# Patient Record
Sex: FEMALE | Race: WHITE | HISPANIC OR LATINO | Employment: FULL TIME | URBAN - METROPOLITAN AREA
[De-identification: names, ages, dates, MRNs, and addresses within clinical notes are randomized per-mention and may not be internally consistent; named-entity substitution may affect disease eponyms.]

---

## 2017-05-08 ENCOUNTER — ALLSCRIPTS OFFICE VISIT (OUTPATIENT)
Dept: OTHER | Facility: OTHER | Age: 22
End: 2017-05-08

## 2017-05-08 DIAGNOSIS — M54.16 RADICULOPATHY OF LUMBAR REGION: ICD-10-CM

## 2017-06-30 ENCOUNTER — ALLSCRIPTS OFFICE VISIT (OUTPATIENT)
Dept: OTHER | Facility: OTHER | Age: 22
End: 2017-06-30

## 2017-07-10 DIAGNOSIS — M54.16 RADICULOPATHY OF LUMBAR REGION: ICD-10-CM

## 2017-07-11 ENCOUNTER — GENERIC CONVERSION - ENCOUNTER (OUTPATIENT)
Dept: OTHER | Facility: OTHER | Age: 22
End: 2017-07-11

## 2017-07-24 ENCOUNTER — ALLSCRIPTS OFFICE VISIT (OUTPATIENT)
Dept: OTHER | Facility: OTHER | Age: 22
End: 2017-07-24

## 2017-08-28 ENCOUNTER — ALLSCRIPTS OFFICE VISIT (OUTPATIENT)
Dept: OTHER | Facility: OTHER | Age: 22
End: 2017-08-28

## 2017-09-27 ENCOUNTER — ALLSCRIPTS OFFICE VISIT (OUTPATIENT)
Dept: OTHER | Facility: OTHER | Age: 22
End: 2017-09-27

## 2017-10-05 ENCOUNTER — ALLSCRIPTS OFFICE VISIT (OUTPATIENT)
Dept: OTHER | Facility: OTHER | Age: 22
End: 2017-10-05
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
## 2017-10-09 ENCOUNTER — GENERIC CONVERSION - ENCOUNTER (OUTPATIENT)
Dept: OTHER | Facility: OTHER | Age: 22
End: 2017-10-09

## 2017-10-11 ENCOUNTER — HOSPITAL ENCOUNTER (EMERGENCY)
Facility: HOSPITAL | Age: 22
Discharge: HOME | End: 2017-10-11
Attending: EMERGENCY MEDICINE | Admitting: EMERGENCY MEDICINE
Payer: COMMERCIAL

## 2017-10-11 ENCOUNTER — GENERIC CONVERSION - ENCOUNTER (OUTPATIENT)
Dept: OTHER | Facility: OTHER | Age: 22
End: 2017-10-11

## 2017-10-11 VITALS
BODY MASS INDEX: 19.94 KG/M2 | OXYGEN SATURATION: 100 % | HEART RATE: 83 BPM | SYSTOLIC BLOOD PRESSURE: 119 MMHG | RESPIRATION RATE: 20 BRPM | TEMPERATURE: 98 F | DIASTOLIC BLOOD PRESSURE: 57 MMHG | HEIGHT: 61 IN | WEIGHT: 105.6 LBS

## 2017-10-11 DIAGNOSIS — O21.9 NAUSEA AND VOMITING IN PREGNANCY PRIOR TO 22 WEEKS GESTATION: Primary | ICD-10-CM

## 2017-10-11 LAB
ANION GAP SERPL CALCULATED.3IONS-SCNC: 9 MMOL/L (ref 4–13)
BACTERIA UR QL AUTO: ABNORMAL /HPF
BASOPHILS # BLD AUTO: 0.04 THOUSANDS/ΜL (ref 0–0.1)
BASOPHILS NFR BLD AUTO: 1 % (ref 0–1)
BILIRUB UR QL STRIP: NEGATIVE
BUN SERPL-MCNC: 7 MG/DL (ref 5–25)
CALCIUM SERPL-MCNC: 9.4 MG/DL (ref 8.3–10.1)
CHLORIDE SERPL-SCNC: 100 MMOL/L (ref 100–108)
CLARITY UR: CLEAR
CO2 SERPL-SCNC: 27 MMOL/L (ref 21–32)
COLOR UR: YELLOW
CREAT SERPL-MCNC: 0.5 MG/DL (ref 0.6–1.3)
EOSINOPHIL # BLD AUTO: 0.09 THOUSAND/ΜL (ref 0–0.61)
EOSINOPHIL NFR BLD AUTO: 1 % (ref 0–6)
ERYTHROCYTE [DISTWIDTH] IN BLOOD BY AUTOMATED COUNT: 12.5 % (ref 11.6–15.1)
GFR SERPL CREATININE-BSD FRML MDRD: 137 ML/MIN/1.73SQ M
GLUCOSE SERPL-MCNC: 81 MG/DL (ref 65–140)
GLUCOSE UR STRIP-MCNC: NEGATIVE MG/DL
HCT VFR BLD AUTO: 40.1 % (ref 34.8–46.1)
HGB BLD-MCNC: 13.5 G/DL (ref 11.5–15.4)
HGB UR QL STRIP.AUTO: NEGATIVE
KETONES UR STRIP-MCNC: ABNORMAL MG/DL
LEUKOCYTE ESTERASE UR QL STRIP: ABNORMAL
LYMPHOCYTES # BLD AUTO: 1.79 THOUSANDS/ΜL (ref 0.6–4.47)
LYMPHOCYTES NFR BLD AUTO: 22 % (ref 14–44)
MCH RBC QN AUTO: 27.3 PG (ref 26.8–34.3)
MCHC RBC AUTO-ENTMCNC: 33.7 G/DL (ref 31.4–37.4)
MCV RBC AUTO: 81 FL (ref 82–98)
MONOCYTES # BLD AUTO: 0.65 THOUSAND/ΜL (ref 0.17–1.22)
MONOCYTES NFR BLD AUTO: 8 % (ref 4–12)
NEUTROPHILS # BLD AUTO: 5.64 THOUSANDS/ΜL (ref 1.85–7.62)
NEUTS SEG NFR BLD AUTO: 69 % (ref 43–75)
NITRITE UR QL STRIP: NEGATIVE
NON-SQ EPI CELLS URNS QL MICRO: ABNORMAL /HPF
NRBC BLD AUTO-RTO: 0 /100 WBCS
PH UR STRIP.AUTO: 6 [PH] (ref 4.5–8)
PLATELET # BLD AUTO: 264 THOUSANDS/UL (ref 149–390)
PMV BLD AUTO: 9.5 FL (ref 8.9–12.7)
POTASSIUM SERPL-SCNC: 3.8 MMOL/L (ref 3.5–5.3)
PROT UR STRIP-MCNC: NEGATIVE MG/DL
RBC # BLD AUTO: 4.94 MILLION/UL (ref 3.81–5.12)
RBC #/AREA URNS AUTO: ABNORMAL /HPF
SODIUM SERPL-SCNC: 136 MMOL/L (ref 136–145)
SP GR UR STRIP.AUTO: <=1.005 (ref 1–1.03)
UROBILINOGEN UR QL STRIP.AUTO: 0.2 E.U./DL
WBC # BLD AUTO: 8.23 THOUSAND/UL (ref 4.31–10.16)
WBC #/AREA URNS AUTO: ABNORMAL /HPF

## 2017-10-11 PROCEDURE — 96374 THER/PROPH/DIAG INJ IV PUSH: CPT

## 2017-10-11 PROCEDURE — 36415 COLL VENOUS BLD VENIPUNCTURE: CPT | Performed by: EMERGENCY MEDICINE

## 2017-10-11 PROCEDURE — 96361 HYDRATE IV INFUSION ADD-ON: CPT

## 2017-10-11 PROCEDURE — 80048 BASIC METABOLIC PNL TOTAL CA: CPT | Performed by: EMERGENCY MEDICINE

## 2017-10-11 PROCEDURE — 99284 EMERGENCY DEPT VISIT MOD MDM: CPT

## 2017-10-11 PROCEDURE — 80081 OBSTETRIC PANEL INC HIV TSTG: CPT | Performed by: EMERGENCY MEDICINE

## 2017-10-11 PROCEDURE — 96375 TX/PRO/DX INJ NEW DRUG ADDON: CPT

## 2017-10-11 PROCEDURE — 81001 URINALYSIS AUTO W/SCOPE: CPT | Performed by: EMERGENCY MEDICINE

## 2017-10-11 RX ORDER — CALCIUM CARBONATE 300MG(750)
TABLET,CHEWABLE ORAL
COMMUNITY
End: 2018-01-29 | Stop reason: ALTCHOICE

## 2017-10-11 RX ORDER — METOCLOPRAMIDE HYDROCHLORIDE 5 MG/ML
5 INJECTION INTRAMUSCULAR; INTRAVENOUS ONCE
Status: COMPLETED | OUTPATIENT
Start: 2017-10-11 | End: 2017-10-11

## 2017-10-11 RX ORDER — DIPHENHYDRAMINE HYDROCHLORIDE 50 MG/ML
25 INJECTION INTRAMUSCULAR; INTRAVENOUS ONCE
Status: COMPLETED | OUTPATIENT
Start: 2017-10-11 | End: 2017-10-11

## 2017-10-11 RX ORDER — METOCLOPRAMIDE 10 MG/1
5 TABLET ORAL 3 TIMES DAILY PRN
Qty: 15 TABLET | Refills: 0 | Status: SHIPPED | OUTPATIENT
Start: 2017-10-11 | End: 2018-01-29 | Stop reason: ALTCHOICE

## 2017-10-11 RX ADMIN — DIPHENHYDRAMINE HYDROCHLORIDE 25 MG: 50 INJECTION, SOLUTION INTRAMUSCULAR; INTRAVENOUS at 12:46

## 2017-10-11 RX ADMIN — SODIUM CHLORIDE 1000 ML: 0.9 INJECTION, SOLUTION INTRAVENOUS at 12:11

## 2017-10-11 RX ADMIN — METOCLOPRAMIDE 5 MG: 5 INJECTION, SOLUTION INTRAMUSCULAR; INTRAVENOUS at 12:45

## 2017-10-11 NOTE — ED NOTES
Patient is resting comfortably  States feeling "better" and "feeling hungry" at this time       Sajan Will RN  10/11/17 2582

## 2017-10-11 NOTE — ED NOTES
Pt aware that a urine sample is needed, unable to void at this time       López Sommer RN  10/11/17 3468

## 2017-10-11 NOTE — DISCHARGE INSTRUCTIONS
Hyperemesis Gravidarum   WHAT YOU NEED TO KNOW:   Hyperemesis gravidarum is a severe form of nausea and vomiting that happens during pregnancy  Hyperemesis is more severe than morning sickness  It may cause you to have nausea or vomiting all day for many days  It may also keep you from getting enough food and liquid  DISCHARGE INSTRUCTIONS:   Return to the emergency department if:   · You have signs of severe dehydration including little to no urine and dry mouth or lips  · You have severe stomach pain  · You feel too weak or dizzy to stand up  · You see blood in your vomit or bowel movements  Contact your healthcare provider if:   · You cannot keep any food or liquid down  · You are losing weight  · You have a fever  · You have questions or concerns about your condition or care  Medicines:   · Medicines, vitamins, or supplements  may be given to help decrease nausea and vomiting  · Take your medicine as directed  Contact your healthcare provider if you think your medicine is not helping or if you have side effects  Tell him of her if you are allergic to any medicine  Keep a list of the medicines, vitamins, and herbs you take  Include the amounts, and when and why you take them  Bring the list or the pill bottles to follow-up visits  Carry your medicine list with you in case of an emergency  Manage your symptoms:   · Eat small amounts of food every 1 to 2 hours  Some examples of good foods to eat include broth, toast, crackers, fruit, eggs, gelatin, or cottage cheese  Do not eat spicy or high-fat foods  Foods and drinks with ginger, such as ginger ale, may help to decrease nausea and vomiting  · Drink liquids as directed  You may need to drink small amounts of liquid often to prevent dehydration  Ask how much liquid to drink each day and which liquids are best for you  · Rest when you need to    Start activity slowly and work up to your usual routine as you start to feel better  · Medicines, vitamins, or supplements  may be given to help decrease nausea and vomiting  · Weigh yourself daily if directed by your healthcare provider  You may need to keep a record of your daily weights for your healthcare provider  He may want to make sure you are not losing too much weight  Follow up with your healthcare provider as directed:  Write down your questions so you remember to ask them during your visits  © 2017 [employer] Information is for End User's use only and may not be sold, redistributed or otherwise used for commercial purposes  All illustrations and images included in CareNotes® are the copyrighted property of A D [employer] M , Inc  or Jose Luis Ledezma  The above information is an  only  It is not intended as medical advice for individual conditions or treatments  Talk to your doctor, nurse or pharmacist before following any medical regimen to see if it is safe and effective for you

## 2017-10-11 NOTE — ED PROVIDER NOTES
History  Chief Complaint   Patient presents with    Vomiting During Pregnancy     Pt c/o nausea and vomiting since beginning of pregnancy (10 weeks); vomited x 1 today following eating breakfast; pt spoke with OB-GYN Dr Pastora Wolff who told her to come to ED for dehydration       History provided by:  Patient  Vomiting   Severity:  Moderate  Duration:  2 weeks  Timing:  Constant  Number of daily episodes:  3-4  Quality:  Stomach contents  Able to tolerate:  Liquids  How soon after eating does vomiting occur:  5 minutes  Progression:  Worsening  Chronicity:  New  Recent urination:  Decreased  Context: not post-tussive and not self-induced    Relieved by:  None tried  Worsened by:  Nothing  Ineffective treatments:  None tried  Associated symptoms: no cough, no diarrhea, no fever, no headaches, no myalgias, no sore throat and no URI    Risk factors: pregnant (pt is , is about 10 weeks, had confirmatory IUP on u/s withoutpt OB eval)    Risk factors: no prior abdominal surgery        Prior to Admission Medications   Prescriptions Last Dose Informant Patient Reported? Taking? IRON PO   Yes No   Sig: Take 325 mg by mouth   Prenatal MV-Min-FA-Omega-3 (PRENATAL GUMMIES/DHA & FA) 0 4-32 5 MG CHEW   Yes No   Sig: Chew      Facility-Administered Medications: None       Past Medical History:   Diagnosis Date    Back injury     Pregnant        History reviewed  No pertinent surgical history  History reviewed  No pertinent family history  I have reviewed and agree with the history as documented  Social History   Substance Use Topics    Smoking status: Never Smoker    Smokeless tobacco: Never Used    Alcohol use No        Review of Systems   Constitutional: Negative for fever  HENT: Negative for sore throat  Respiratory: Negative for cough  Gastrointestinal: Positive for vomiting  Negative for diarrhea  Musculoskeletal: Negative for myalgias  Neurological: Negative for headaches     All other systems reviewed and are negative  Physical Exam  ED Triage Vitals [10/11/17 1118]   Temperature Pulse Respirations Blood Pressure SpO2   98 °F (36 7 °C) 77 20 125/76 100 %      Temp Source Heart Rate Source Patient Position - Orthostatic VS BP Location FiO2 (%)   Oral Monitor Sitting Right arm --      Pain Score       --           Physical Exam   Constitutional: She is oriented to person, place, and time  She appears well-developed and well-nourished  No distress  HENT:   Head: Normocephalic and atraumatic  Nose: Nose normal    Mouth/Throat: Oropharynx is clear and moist  Mucous membranes are dry  Eyes: Conjunctivae and EOM are normal  Pupils are equal, round, and reactive to light  Neck: Normal range of motion  Neck supple  Cardiovascular: Normal rate, regular rhythm, normal heart sounds and intact distal pulses  Pulmonary/Chest: Effort normal and breath sounds normal  No respiratory distress  Abdominal: Soft  Bowel sounds are normal  She exhibits no distension  There is no tenderness  Musculoskeletal: Normal range of motion  Neurological: She is alert and oriented to person, place, and time  Skin: Skin is warm and dry  She is not diaphoretic  Psychiatric: She has a normal mood and affect  Nursing note and vitals reviewed        ED Medications  Medications   sodium chloride 0 9 % bolus 1,000 mL (0 mL Intravenous Stopped 10/11/17 1405)   metoclopramide (REGLAN) injection 5 mg (5 mg Intravenous Given 10/11/17 1245)   diphenhydrAMINE (BENADRYL) injection 25 mg (25 mg Intravenous Given 10/11/17 1246)       Diagnostic Studies  Labs Reviewed   CBC AND DIFFERENTIAL - Abnormal        Result Value Ref Range Status    MCV 81 (*) 82 - 98 fL Final    WBC 8 23  4 31 - 10 16 Thousand/uL Final    RBC 4 94  3 81 - 5 12 Million/uL Final    Hemoglobin 13 5  11 5 - 15 4 g/dL Final    Hematocrit 40 1  34 8 - 46 1 % Final    MCH 27 3  26 8 - 34 3 pg Final    MCHC 33 7  31 4 - 37 4 g/dL Final    RDW 12 5 11 6 - 15 1 % Final    MPV 9 5  8 9 - 12 7 fL Final    Platelets 498  919 - 390 Thousands/uL Final    nRBC 0  /100 WBCs Final    Neutrophils Relative 69  43 - 75 % Final    Lymphocytes Relative 22  14 - 44 % Final    Monocytes Relative 8  4 - 12 % Final    Eosinophils Relative 1  0 - 6 % Final    Basophils Relative 1  0 - 1 % Final    Neutrophils Absolute 5 64  1 85 - 7 62 Thousands/µL Final    Lymphocytes Absolute 1 79  0 60 - 4 47 Thousands/µL Final    Monocytes Absolute 0 65  0 17 - 1 22 Thousand/µL Final    Eosinophils Absolute 0 09  0 00 - 0 61 Thousand/µL Final    Basophils Absolute 0 04  0 00 - 0 10 Thousands/µL Final   BASIC METABOLIC PANEL - Abnormal     Creatinine 0 50 (*) 0 60 - 1 30 mg/dL Final    Comment: Standardized to IDMS reference method    Sodium 136  136 - 145 mmol/L Final    Potassium 3 8  3 5 - 5 3 mmol/L Final    Chloride 100  100 - 108 mmol/L Final    CO2 27  21 - 32 mmol/L Final    Anion Gap 9  4 - 13 mmol/L Final    BUN 7  5 - 25 mg/dL Final    Glucose 81  65 - 140 mg/dL Final    Comment:   If the patient is fasting, the ADA then defines impaired fasting glucose as > 100 mg/dL and diabetes as > or equal to 123 mg/dL  Specimen collection should occur prior to Sulfasalazine administration due to the potential for falsely depressed results  Specimen collection should occur prior to Sulfapyridine administration due to the potential for falsely elevated results  Calcium 9 4  8 3 - 10 1 mg/dL Final    eGFR 137  ml/min/1 73sq m Final    Narrative:     National Kidney Disease Education Program recommendations are as follows:  GFR calculation is accurate only with a steady state creatinine  Chronic Kidney disease less than 60 ml/min/1 73 sq  meters  Kidney failure less than 15 ml/min/1 73 sq  meters     UA W REFLEX TO MICROSCOPIC WITH REFLEX TO CULTURE - Abnormal     Leukocytes, UA Small (*) Negative Final    Ketones, UA 15 (1+) (*) Negative mg/dl Final    Color, UA Yellow   Final    Clarity, UA Clear   Final    Specific Gravity, UA <=1 005  1 003 - 1 030 Final    pH, UA 6 0  4 5 - 8 0 Final    Nitrite, UA Negative  Negative Final    Protein, UA Negative  Negative mg/dl Final    Glucose, UA Negative  Negative mg/dl Final    Urobilinogen, UA 0 2  0 2, 1 0 E U /dl E U /dl Final    Bilirubin, UA Negative  Negative Final    Blood, UA Negative  Negative Final   URINE MICROSCOPIC       No orders to display       Procedures  Procedures      Phone Contacts  ED Phone Contact    ED Course  ED Course as of Oct 11 1459   Wed Oct 11, 2017   1458 Patient has gotten IV fluids, was able to get up and urinate, is feeling improved  Would like to try some crackers and to eat something  Will discharge her with Reglan p r n  and follow up with her OBGYN                                 MDM  Number of Diagnoses or Management Options  Nausea and vomiting in pregnancy prior to 22 weeks gestation: new and requires workup     Amount and/or Complexity of Data Reviewed  Clinical lab tests: ordered and reviewed  Review and summarize past medical records: yes (U/s with confirmed IUP with [facility])      CritCare Time    Disposition  Final diagnoses:   Nausea and vomiting in pregnancy prior to 22 weeks gestation     ED Disposition     ED Disposition Condition Comment    Discharge  Isai Mediate discharge to home/self care      Condition at discharge: Good        Follow-up Information     Follow up With Specialties Details Why Contact Info    [facility] Physician Assistant   [address] [place] [address] [facility]      Kelin Tomlin MD Obstetrics and Gynecology Call in 1 day  [address]  [phone]          Patient's Medications   Discharge Prescriptions    METOCLOPRAMIDE (REGLAN) 10 MG TABLET    Take 0 5 tablets by mouth 3 (three) times a day as needed (vomiting)       Start Date: 10/11/2017End Date: --       Order Dose: 5 mg       Quantity: 15 tablet    Refills: 0     No discharge procedures on file      ED Provider  Electronically Signed by       Christopher Zarco MD  10/11/17 6066

## 2017-10-12 ENCOUNTER — GENERIC CONVERSION - ENCOUNTER (OUTPATIENT)
Dept: OTHER | Facility: OTHER | Age: 22
End: 2017-10-12

## 2017-10-13 ENCOUNTER — APPOINTMENT (OUTPATIENT)
Dept: LAB | Facility: CLINIC | Age: 22
End: 2017-10-13
Payer: COMMERCIAL

## 2017-10-13 ENCOUNTER — LAB REQUISITION (OUTPATIENT)
Dept: LAB | Facility: HOSPITAL | Age: 22
End: 2017-10-13
Payer: COMMERCIAL

## 2017-10-13 ENCOUNTER — ALLSCRIPTS OFFICE VISIT (OUTPATIENT)
Dept: OTHER | Facility: OTHER | Age: 22
End: 2017-10-13

## 2017-10-13 ENCOUNTER — GENERIC CONVERSION - ENCOUNTER (OUTPATIENT)
Dept: OTHER | Facility: OTHER | Age: 22
End: 2017-10-13

## 2017-10-13 DIAGNOSIS — Z34.90 ENCOUNTER FOR SUPERVISION OF NORMAL PREGNANCY: ICD-10-CM

## 2017-10-13 LAB
ABO GROUP BLD: NORMAL
BACTERIA UR QL AUTO: ABNORMAL /HPF
BASOPHILS # BLD AUTO: 0.02 THOUSANDS/ΜL (ref 0–0.1)
BASOPHILS NFR BLD AUTO: 0 % (ref 0–1)
BILIRUB UR QL STRIP: NEGATIVE
BLD GP AB SCN SERPL QL: NEGATIVE
CLARITY UR: ABNORMAL
COLOR UR: YELLOW
EOSINOPHIL # BLD AUTO: 0.05 THOUSAND/ΜL (ref 0–0.61)
EOSINOPHIL NFR BLD AUTO: 1 % (ref 0–6)
ERYTHROCYTE [DISTWIDTH] IN BLOOD BY AUTOMATED COUNT: 13.2 % (ref 11.6–15.1)
GLUCOSE UR STRIP-MCNC: NEGATIVE MG/DL
HBV SURFACE AG SER QL: NORMAL
HCT VFR BLD AUTO: 39.8 % (ref 34.8–46.1)
HGB BLD-MCNC: 13.2 G/DL (ref 11.5–15.4)
HGB UR QL STRIP.AUTO: NEGATIVE
KETONES UR STRIP-MCNC: NEGATIVE MG/DL
LEUKOCYTE ESTERASE UR QL STRIP: ABNORMAL
LYMPHOCYTES # BLD AUTO: 1.53 THOUSANDS/ΜL (ref 0.6–4.47)
LYMPHOCYTES NFR BLD AUTO: 21 % (ref 14–44)
MCH RBC QN AUTO: 27.4 PG (ref 26.8–34.3)
MCHC RBC AUTO-ENTMCNC: 33.2 G/DL (ref 31.4–37.4)
MCV RBC AUTO: 83 FL (ref 82–98)
MONOCYTES # BLD AUTO: 0.54 THOUSAND/ΜL (ref 0.17–1.22)
MONOCYTES NFR BLD AUTO: 7 % (ref 4–12)
NEUTROPHILS # BLD AUTO: 5.19 THOUSANDS/ΜL (ref 1.85–7.62)
NEUTS SEG NFR BLD AUTO: 71 % (ref 43–75)
NITRITE UR QL STRIP: NEGATIVE
NON-SQ EPI CELLS URNS QL MICRO: ABNORMAL /HPF
NRBC BLD AUTO-RTO: 0 /100 WBCS
PH UR STRIP.AUTO: 6 [PH] (ref 4.5–8)
PLATELET # BLD AUTO: 271 THOUSANDS/UL (ref 149–390)
PMV BLD AUTO: 10.1 FL (ref 8.9–12.7)
PROT UR STRIP-MCNC: NEGATIVE MG/DL
RBC # BLD AUTO: 4.81 MILLION/UL (ref 3.81–5.12)
RBC #/AREA URNS AUTO: ABNORMAL /HPF
RH BLD: POSITIVE
RUBV IGG SERPL IA-ACNC: 96.5 IU/ML
SP GR UR STRIP.AUTO: 1.02 (ref 1–1.03)
SPECIMEN EXPIRATION DATE: NORMAL
UROBILINOGEN UR QL STRIP.AUTO: 0.2 E.U./DL
WBC # BLD AUTO: 7.35 THOUSAND/UL (ref 4.31–10.16)
WBC #/AREA URNS AUTO: ABNORMAL /HPF

## 2017-10-13 PROCEDURE — 81001 URINALYSIS AUTO W/SCOPE: CPT

## 2017-10-13 PROCEDURE — 85025 COMPLETE CBC W/AUTO DIFF WBC: CPT

## 2017-10-13 PROCEDURE — 87086 URINE CULTURE/COLONY COUNT: CPT

## 2017-10-13 PROCEDURE — 36415 COLL VENOUS BLD VENIPUNCTURE: CPT

## 2017-10-14 LAB — BACTERIA UR CULT: NORMAL

## 2017-10-16 LAB
HIV 1+2 AB+HIV1 P24 AG SERPL QL IA: NORMAL
RPR SER QL: NORMAL

## 2017-11-03 ENCOUNTER — GENERIC CONVERSION - ENCOUNTER (OUTPATIENT)
Dept: OTHER | Facility: OTHER | Age: 22
End: 2017-11-03

## 2017-11-03 PROCEDURE — G0145 SCR C/V CYTO,THINLAYER,RESCR: HCPCS | Performed by: NURSE PRACTITIONER

## 2017-11-08 ENCOUNTER — LAB REQUISITION (OUTPATIENT)
Dept: LAB | Facility: HOSPITAL | Age: 22
End: 2017-11-08
Payer: COMMERCIAL

## 2017-11-08 DIAGNOSIS — Z11.3 ENCOUNTER FOR SCREENING FOR INFECTIONS WITH PREDOMINANTLY SEXUAL MODE OF TRANSMISSION: ICD-10-CM

## 2017-11-08 DIAGNOSIS — Z01.419 ENCOUNTER FOR GYNECOLOGICAL EXAMINATION WITHOUT ABNORMAL FINDING: ICD-10-CM

## 2017-11-08 DIAGNOSIS — Z34.90 ENCOUNTER FOR SUPERVISION OF NORMAL PREGNANCY: ICD-10-CM

## 2017-11-08 PROCEDURE — 87591 N.GONORRHOEAE DNA AMP PROB: CPT | Performed by: NURSE PRACTITIONER

## 2017-11-08 PROCEDURE — 87491 CHLMYD TRACH DNA AMP PROBE: CPT | Performed by: NURSE PRACTITIONER

## 2017-11-09 ENCOUNTER — GENERIC CONVERSION - ENCOUNTER (OUTPATIENT)
Dept: OTHER | Facility: OTHER | Age: 22
End: 2017-11-09

## 2017-11-09 LAB
CHLAMYDIA DNA CVX QL NAA+PROBE: NORMAL
N GONORRHOEA DNA GENITAL QL NAA+PROBE: NORMAL

## 2017-11-10 ENCOUNTER — GENERIC CONVERSION - ENCOUNTER (OUTPATIENT)
Dept: OTHER | Facility: OTHER | Age: 22
End: 2017-11-10

## 2017-11-15 ENCOUNTER — GENERIC CONVERSION - ENCOUNTER (OUTPATIENT)
Dept: OTHER | Facility: OTHER | Age: 22
End: 2017-11-15

## 2017-11-16 LAB
LAB AP GYN PRIMARY INTERPRETATION: NORMAL
Lab: NORMAL
PATH INTERP SPEC-IMP: NORMAL

## 2017-11-19 ENCOUNTER — GENERIC CONVERSION - ENCOUNTER (OUTPATIENT)
Dept: OTHER | Facility: OTHER | Age: 22
End: 2017-11-19

## 2017-11-27 ENCOUNTER — GENERIC CONVERSION - ENCOUNTER (OUTPATIENT)
Dept: OTHER | Facility: OTHER | Age: 22
End: 2017-11-27

## 2017-12-14 ENCOUNTER — GENERIC CONVERSION - ENCOUNTER (OUTPATIENT)
Dept: OTHER | Facility: OTHER | Age: 22
End: 2017-12-14

## 2017-12-19 ENCOUNTER — GENERIC CONVERSION - ENCOUNTER (OUTPATIENT)
Dept: OTHER | Facility: OTHER | Age: 22
End: 2017-12-19

## 2017-12-19 ENCOUNTER — ALLSCRIPTS OFFICE VISIT (OUTPATIENT)
Dept: PERINATAL CARE | Facility: CLINIC | Age: 22
End: 2017-12-19
Payer: COMMERCIAL

## 2017-12-19 PROCEDURE — 76805 OB US >/= 14 WKS SNGL FETUS: CPT | Performed by: OBSTETRICS & GYNECOLOGY

## 2017-12-27 ENCOUNTER — GENERIC CONVERSION - ENCOUNTER (OUTPATIENT)
Dept: OTHER | Facility: OTHER | Age: 22
End: 2017-12-27

## 2018-01-09 NOTE — MISCELLANEOUS
Message   Recorded as Task   Date: 10/09/2017 01:28 PM, Created By: Matt Lim   Task Name: Miscellaneous   Assigned To: [facility] es clinical,Team   Regarding Patient: Adelina Singh, Status: Active   Comment:    Rae Howard - 09 Oct 2017 1:28 PM     TASK CREATED  Pt called stating she dropped off FMLA paperwork on Friday and is requesting Dr Jaden Urias to sign it  She said her PCP's office is requesting to have Dr Jaden Urias sign it  Pls call pt at [phone]  Kris Whitingiff - 09 Oct 2017 1:40 PM     TASK EDITED  Informed pt that SI reviewed it and will not fill out  Advised her to check with PCP  Pt states she will check with neurology  Active Problems    1  Amenorrhea (626 0) (N91 2)   2  Back pain (724 5) (M54 9)   3  Headache (784 0) (R51)   4  Lumbar radiculopathy (724 4) (M54 16)   5  Strain of thoracic region (847 1) (S29 019A)    Current Meds   1  Iron 325 (65 Fe) MG Oral Tablet; TAKE 1 TABLET DAILY AS DIRECTED; Therapy: (Recorded:06Crk2761) to Recorded   2  Prenatal Gummies/DHA & FA CHEW;   Therapy: (Recorded:13Ejb5813) to Recorded    Allergies    1  No Known Drug Allergies    2  No Known Food Allergies   3   Seasonal    Signatures   Electronically signed by : Candance Deed, ; Oct  9 2017  1:40PM EST                       (Author)

## 2018-01-09 NOTE — RESULT NOTES
Verified Results  (1) CHLAMYDIA/GC AMPLIFIED DNA, PCR [id number] 01:49PM Sonja Garcia     Test Name Result Flag Reference   CHLAMYDIA,AMPLIFIED DNA PROBE   C  trachomatis Amplified DNA Negative   C  trachomatis Amplified DNA Negative   N  GONORRHOEAE AMPLIFIED DNA   N  gonorrhoeae Amplified DNA Negative   N  gonorrhoeae Amplified DNA Negative

## 2018-01-11 NOTE — PROCEDURES
Results/Data    Procedure: Electromyogram and Nerve Conduction Study  Indication: Bilateral Lower Extremities   Referred by Dr Tamara Johnson  The procedure's were discussed with the patient  Written consent was obtained prior to the procedure and is detailed in the patient's record  Prior to the start of the procedure a time out was taken and the identity of the patient was confirmed via name and date of birth with the patient  The correct site and the procedure to be performed were confirmed  The correct side was confirmed if applicable  The positioning of the patient was verified  The availability of the correct equipment was verified  Procedure Start Time: 10:30    Technique: A sterile concentric needle electrode was used  The patient tolerated the procedure well  There were no complications  Results :EMG BILATERAL LOWER EXTREMITY    Motor and sensory conduction studies were performed on the bilateral peroneal, tibial and sural nerves  The distal motor latencies were normal  The motor action potential amplitudes were normal  Conduction velocities were normal including conduction of the peroneal nerve across the fibular head  Bilateral peroneal and tibial F waves were normal     Bilateral sural distal sensory latencies were normal with normal sensory action potential amplitudes  H  reflexes were symmetrically normal     Concentric needle EMG was performed in various distal and proximal muscles of the lower extremities bilaterally including EDB, tibialis anterior, gastrocnemius medius, vastus lateralis, gluteus medius and the low lumbar paraspinal regions  There is no evidence of spontaneous activity seen  The compound motor unit potentials were of normal configuration and interference patterns were full or full for effort  IMPRESSION: This is a normal EMG of the bilateral lower extremities  HOSEA Carlson   Electronically signed by : Jesi Nicole MD; Sep 12 2016 11: 15AM EST                       (Author)

## 2018-01-11 NOTE — MISCELLANEOUS
Message  pt called - stating 'I still have vomiting even with taking the zofran " advised pt to continue hydrating well, take sips of ginger ale , crackers, pretzels, etc  (is able to tolerate liquids ) Dr Harvey Talbot tasked & notified  advised pt -will f/u with her tomorrow      Active Problems    1  Amenorrhea (626 0) (N91 2)   2  Back pain (724 5) (M54 9)   3  Encounter for gynecological examination without abnormal finding (V72 31) (Z01 419)   4  Headache (784 0) (R51)   5  Lumbar radiculopathy (724 4) (M54 16)   6  Need for influenza vaccination (V04 81) (Z23)   7  Pregnancy, obstetrical care (V22 1) (Z34 90)   8  Screening for STD (sexually transmitted disease) (V74 5) (Z11 3)   9  Strain of thoracic region (847 1) (S29 019A)    Current Meds   1  Iron 325 (65 Fe) MG Oral Tablet; TAKE 1 TABLET DAILY AS DIRECTED; Therapy: (Recorded:05Oct2017) to Recorded   2  Ondansetron 4 MG Oral Tablet Disintegrating; TAKE 1 TABLET Every 8 hours PRN; Therapy: 20HNN9827 to (Evaluate:07Udn6176)  Requested for: 58DVT5811; Last   Rx:03Nov2017 Ordered   3  Prenatal Gummies/DHA & FA CHEW;   Therapy: (Recorded:11Vws0069) to Recorded    Allergies    1  No Known Drug Allergies    2  No Known Food Allergies   3   Seasonal    Signatures   Electronically signed by : Erika Garcia RN; Nov 9 2017  5:35PM EST                       (Author)

## 2018-01-11 NOTE — MISCELLANEOUS
Message   Recorded as Task   Date: 11/09/2017 05:33 PM, Created By: Mimi Kothari   Task Name: Follow Up   Assigned To: MeetFritz   Regarding Patient: Gutierrez Huitron, Status: Active   Comment:    Meet,Jan - 09 Nov 2017 5:33 PM     TASK CREATED  Otis Hoffmann,  this pt , Claudy Gonzalez called this evening, stating, "Zofran is not working for her " please advise   Thanks Joan Abrams - 10 Nov 2017 10:40 AM     TASK REPLIED TO: Previously Assigned To Joan Mason                      we can try phenergan suppository or reglan  Also- this is a prenatal patient so any provider can answer this question  MeetFritz - 15 Nov 2017 8:33 AM     TASK EDITED  reglan order, per dr Fritz Overall  eprescribed through allscripts  - pt informed- will  rx today & take as directed  Active Problems    1  Amenorrhea (626 0) (N91 2)   2  Back pain (724 5) (M54 9)   3  Encounter for gynecological examination without abnormal finding (V72 31) (Z01 419)   4  Headache (784 0) (R51)   5  Hyperemesis of pregnancy (643 00) (O21 0)   6  Lumbar radiculopathy (724 4) (M54 16)   7  Need for influenza vaccination (V04 81) (Z23)   8  Pregnancy, obstetrical care (V22 1) (Z34 90)   9  Screening for STD (sexually transmitted disease) (V74 5) (Z11 3)   10  Strain of thoracic region (847 1) (S29 019A)    Current Meds   1  Iron 325 (65 Fe) MG Oral Tablet; TAKE 1 TABLET DAILY AS DIRECTED; Therapy: (Recorded:05Oct2017) to Recorded   2  Ondansetron 4 MG Oral Tablet Disintegrating; TAKE 1 TABLET Every 8 hours PRN; Therapy: 80TQZ7783 to (Evaluate:05Ctl8929)  Requested for: 47DUV2552; Last   Rx:03Nov2017 Ordered   3  Prenatal Gummies/DHA & FA CHEW;   Therapy: (Recorded:50Bhf0381) to Recorded    Allergies    1  No Known Drug Allergies    2  No Known Food Allergies   3  Seasonal    Plan  Hyperemesis of pregnancy    · Metoclopramide HCl - 10 MG Oral Tablet (Reglan);  Take 1 tablet PO Q 8 hrs, prn    Signatures Electronically signed by : Carolyn Medrano RN; Nov 15 2017  8:34AM EST                       (Author)

## 2018-01-12 VITALS
HEIGHT: 62 IN | WEIGHT: 105 LBS | HEART RATE: 72 BPM | BODY MASS INDEX: 19.32 KG/M2 | DIASTOLIC BLOOD PRESSURE: 60 MMHG | SYSTOLIC BLOOD PRESSURE: 112 MMHG

## 2018-01-13 VITALS
SYSTOLIC BLOOD PRESSURE: 106 MMHG | DIASTOLIC BLOOD PRESSURE: 64 MMHG | WEIGHT: 101 LBS | BODY MASS INDEX: 18.58 KG/M2 | HEIGHT: 62 IN

## 2018-01-13 VITALS
WEIGHT: 102.13 LBS | HEART RATE: 90 BPM | HEIGHT: 62 IN | BODY MASS INDEX: 18.8 KG/M2 | SYSTOLIC BLOOD PRESSURE: 109 MMHG | DIASTOLIC BLOOD PRESSURE: 82 MMHG

## 2018-01-14 VITALS
WEIGHT: 102.13 LBS | TEMPERATURE: 98.2 F | SYSTOLIC BLOOD PRESSURE: 110 MMHG | DIASTOLIC BLOOD PRESSURE: 58 MMHG | BODY MASS INDEX: 18.8 KG/M2 | HEIGHT: 62 IN | HEART RATE: 100 BPM

## 2018-01-14 VITALS
DIASTOLIC BLOOD PRESSURE: 72 MMHG | HEART RATE: 103 BPM | BODY MASS INDEX: 18.98 KG/M2 | HEIGHT: 62 IN | WEIGHT: 103.13 LBS | SYSTOLIC BLOOD PRESSURE: 122 MMHG

## 2018-01-15 NOTE — MISCELLANEOUS
Message  Filled out patient's work status form, since her family physician and pain specialist refusing to fill it out, I advised the patient to go for a repeat MRI scan of the lumbar spine, she wants to hold off on that and we did discuss with her gynecologist and family physician and let us know, she was advised to follow-up with her OB/GYN pain specialist and family physician and go to the ER and call us if has any worsening symptoms        Signatures   Electronically signed by : Nicanor Granados MD; Oct 11 2017  4:12PM EST                       (Author)

## 2018-01-15 NOTE — MISCELLANEOUS
Message   Recorded as Task   Date: 10/11/2017 10:29 AM, Created By: Olesya Bourgeois   Task Name: Care Coordination   Assigned To: Rayna Miller   Regarding Patient: Yessenia Hawk, Status: In Progress   Comment:    Olesya Bourgeois - 11 Oct 2017 10:29 AM     TASK CREATED  Pt had early u/s 9/27  Showed she was 9 and 6/7 weeks then  (About 11 weeks now)  Pt due 5/6  Pt has been having nausea and vomiting but it is now severe  Cannot keep anything down  Vomited 20 min  ago  Basically no intake in past 24 hrs - always vomiting  Urinated about 4x yesterday  (dark yellow)  Is lightheaded and dizzy  First pregnancy  Call at [phone] [facility] [address] Oct 2017 10:41 AM     TASK EDITED  I spoke with KTM  Pt is to go to [address] [place] [address]  I called them - [phone] and they are expecting her  Needs hydration  Pt weighs 102 lbs  Pt to have someone drive her  Olesya Bourgeois - 11 Oct 2017 10:49 AM     TASK IN PROGRESS   Olesya Christelle - 12 Oct 2017 11:42 AM     TASK EDITED  Pt much better today  Was given ivs yesterday and discharged with reglan  Has ob interview tomorrow        Active Problems    1  Amenorrhea (626 0) (N91 2)   2  Back pain (724 5) (M54 9)   3  Headache (784 0) (R51)   4  Lumbar radiculopathy (724 4) (M54 16)   5  Strain of thoracic region (847 1) (S29 019A)    Current Meds   1  Iron 325 (65 Fe) MG Oral Tablet; TAKE 1 TABLET DAILY AS DIRECTED; Therapy: (Recorded:62Dat6071) to Recorded   2  Prenatal Gummies/DHA & FA CHEW;   Therapy: (Recorded:20Rmf8713) to Recorded    Allergies    1  No Known Drug Allergies    2  No Known Food Allergies   3  Seasonal    Signatures   Electronically signed by :  Radhika Sloan, ; Oct 12 2017 11:42AM EST                       (Author)

## 2018-01-17 NOTE — RESULT NOTES
Verified Results  (1) THIN PREP PAP WITH IMAGING 51RTR6311 09:49AM Indigo Holt     Test Name Result Flag Reference   LAB AP CASE REPORT (Report)     Gynecologic Cytology Report            Case: [id number]                  Authorizing Provider: ANSELMO Fraser    Collected:      11/03/2017           First Screen:     AmadoZACH Howell   Received:      11/10/2017 1021        Pathologist:      Cristina James MD                                Specimen:  LIQUID-BASED PAP, SCREENING, Cervix   LAB AP GYN PRIMARY INTERPRETATION      Epithelial cell abnormality  Electronically signed by Cristina James MD on 11/16/2017 at 9:49 AM   LAB AP GYN INTERPRETATION      Low grade squamous intraepithelial lesion   LAB AP GYN SPECIMEN ADEQUACY      Satisfactory for evaluation  Endocervical/transformation zone component present  LAB AP GYN NOTE      Interpretation performed at [place] [address]  LAB AP GYN ADDITIONAL INFORMATION (Report)     [facility] FDA approved ,  and ThinPrep Imaging System are   utilized with strict adherence to the 's instruction manual to   prepare gynecologic and non-gynecologic cytology specimens for the   production of ThinPrep slides as well as for gynecologic ThinPrep imaging  These processes have been validated by our laboratory and/or by the     The Pap test is not a diagnostic procedure and should not be used as the   sole means to detect cervical cancer  It is only a screening procedure to   aid in the detection of cervical cancer and its precursors  Both   false-negative and false-positive results have been experienced  Your   patient's test result should be interpreted in this context together with   the history and clinical findings

## 2018-01-17 NOTE — MISCELLANEOUS
Provider Comments  Provider Comments:   1st no show to neurology appointment  No call or message received  Letter being sent out to patient today        Signatures   Electronically signed by : Barney Schumacher MD; Jul 24 2017  4:18PM EST                       (Author)

## 2018-01-22 VITALS
BODY MASS INDEX: 19.32 KG/M2 | WEIGHT: 105 LBS | HEIGHT: 62 IN | SYSTOLIC BLOOD PRESSURE: 118 MMHG | DIASTOLIC BLOOD PRESSURE: 72 MMHG

## 2018-01-22 VITALS
BODY MASS INDEX: 20.06 KG/M2 | DIASTOLIC BLOOD PRESSURE: 64 MMHG | HEIGHT: 62 IN | SYSTOLIC BLOOD PRESSURE: 102 MMHG | WEIGHT: 109 LBS

## 2018-01-22 VITALS
HEIGHT: 62 IN | WEIGHT: 106 LBS | BODY MASS INDEX: 19.51 KG/M2 | DIASTOLIC BLOOD PRESSURE: 60 MMHG | SYSTOLIC BLOOD PRESSURE: 115 MMHG

## 2018-01-22 VITALS
SYSTOLIC BLOOD PRESSURE: 121 MMHG | HEART RATE: 87 BPM | DIASTOLIC BLOOD PRESSURE: 69 MMHG | BODY MASS INDEX: 21.35 KG/M2 | WEIGHT: 116.03 LBS | HEIGHT: 62 IN

## 2018-01-23 NOTE — PROGRESS NOTES
DEC 19 2017         RE: Claudy Gonzalez                              To: [facility]  MR#: [id number]                                  [facility]  : [address]: [id number]                             [facility]    (Exam #: [id number])                           Fax: [phone]      The LMP of this 20year old,  G2, P0-0-1-0 patient was 2017, giving   her an GAVIOTA of 2018 and a current gestational age of 22 weeks 5 days   by dates  A sonographic examination was performed on DEC 19 2017 using   real time equipment  The ultrasound examination was performed using   abdominal technique  The patient has a BMI of 21 9  Her blood pressure   today was 121/69  Earliest US on record:2017 8w3d  2018 GAVIOTA      Thank you very much for your kind referral of Claudy Gonzalez to the   [facility]  in [facility] on 2017 for level II ultrasound   evaluation and MFM assessment  Helen Cole is a 23-year-old  2 para   64  female who is currently at 21-5/7 weeks gestation by an   estimated due date of 2018 which is based upon menstrual dating  Helen Cole has no complaints today  She denies vaginal bleeding  She did not   have genetic screening obtained earlier in the pregnancy  Helen Cole has unremarkable past medical and surgical histories with the   exception of depression, currently not treated medically, GERD, and asthma   as a child  She currently takes no medication with the exception of a   prenatal vitamin on a daily basis  She has a history of one first   trimester elective  in   She denies tobacco, alcohol, or   illicit drug use during the pregnancy  The family genetic history is   negative with respect to genetic abnormalities, birth defects, or mental   retardation  Her mom has hypertension   The family medical history is   otherwise negative with respect to first degree relatives of hypertension,   diabetes, or venous thromboembolism  Cardiac motion was observed at 144 bpm       INDICATIONS      fetal anatomical survey      Exam Types      LEVEL II      RESULTS      Fetus # 1 of 1   Vertex presentation   Fetal growth appeared normal   Placenta Location = Anterior   No placenta previa   Placenta Grade = I      MEASUREMENTS (* Included In Average GA)      AC              14 7 cm        19 weeks 5 days* (7%)   BPD              4 9 cm        20 weeks 5 days* (17%)   HC              17 6 cm        20 weeks 0 days* (<5%)   Femur            3 4 cm        20 weeks 6 days* (22%)      Humerus          3 2 cm        20 weeks 6 days  (24%)      Biorbit          3 2 cm        20 weeks 4 days      HC/AC           1 20   FL/AC           0 23   FL/BPD          0 71   EFW (Ac/Fl/Hc)   341 grams - 0 lbs 12 oz      THE AVERAGE GESTATIONAL AGE is 20 weeks 2 days +/- 10 days  AMNIOTIC FLUID         Largest Vertical Pocket = 4 3 cm   Amniotic Fluid: Normal      CERVICAL EVALUATION      SUPINE      Cervical Length: 3 58 cm      OTHER TEST RESULTS           Funneling?: No             Dynamic Changes?: No        Resp  To TFP?: No      ANATOMY      Head                                    See Details   Face/Neck                               See Details   Th  Cav  Normal   Heart                                   See Details   Abd  Cav  Normal   Stomach                                 Normal   Right Kidney                            Normal   Left Kidney                             Normal   Bladder                                 Normal   Abd  Wall                               Normal   Spine                                   Normal   Extrems                                 See Details   Genitalia                               Normal   Placenta                                Normal   Umbl   Cord Normal   Uterus                                  Normal   PCI                                     Normal      ANATOMY DETAILS      Visualized Appearing Sonographically Normal:   HEAD: (Calvarium, BPD Level, Lateral Ventricles, Choroid Plexus);      FACE/NECK: (Neck, Profile, Orbits, Nose/Lips, Face);    TH  CAV  : (Lungs,   Diaphragm); HEART: (Four Chamber View, Proximal Left Outflow, Proximal   Right Outflow, 3VV, Short Gateway of Greater Vessels, Ductal Arch, Aortic   Arch, Interatrial Septum, IVC, SVC, Cardiac Axis, Cardiac Position);      ABD  CAV : (Liver);    STOMACH, RIGHT KIDNEY, LEFT KIDNEY, BLADDER, ABD  WALL, SPINE: (Cervical Spine, Thoracic Spine, Lumbar Spine, Sacrum);      EXTREMS: (Lt Humerus, Rt Humerus, Lt Forearm, Rt Forearm, Lt Femur, Rt   Femur, Lt Low Leg, Rt Low Leg, Lt Foot, Rt Foot);    GENITALIA, PLACENTA,   UMBL  CORD, UTERUS, PCI      Suboptimally Visualized:   FACE/NECK: (Palate);    EXTREMS: (Lt Hand, Rt Hand)      Not Visualized:   HEAD: (Cavum, Cerebellum, Cisterna Magna);    FACE/NECK: (Nuchal Fold); HEART: (3 Vessel Trachea, Interventricular Septum)      ADNEXA      The left ovary was not visualized  The right ovary appeared normal and   measured 2 5 x 1 7 x 2 5 cm with a volume of 5 6 cc  IMPRESSION      Hernandez IUP   20 weeks and 2 days by this ultrasound  (GAVIOTA=MAY 6 2018)   Vertex presentation   Fetal growth appeared normal   Regular fetal heart rate of 144 bpm   Anterior placenta   No placenta previa      GENERAL COMMENT      No fetal structural abnormality or ultrasound marker for aneuploidy is   identified on the Level II ultrasound study today  Suboptimal imaging of   the hands, posterior fossa, cavum septum pellucidum, palate, nuchal skin   fold, and cardiac septal and 3 vessel tracheal views is afforded by the   constraints related to unfavorable fetal position  Fetal growth and   amniotic fluid volume are normal   The placenta is normal in appearance  Today's ultrasound findings and suggested follow-up were discussed in   detail with Valeria  We discussed that prenatal ultrasound cannot rule out   all congenital abnormalities  Tia Bitter will return to the [facility]    at about 28 weeks gestation to assess interval growth and evaluate   anatomic targets imaged well today  The face to face time, in addition to time spent discussing ultrasound   results, was approximately 10 minutes, greater than 50% of which was spent   during counseling and coordination of care  URSZULA Padron M D     Maternal-Fetal Medicine   Electronically signed 12/24/17 08:21
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
## 2018-01-23 NOTE — MISCELLANEOUS
Message   Recorded as Task   Date: 12/14/2017 04:06 PM, Created By: Laverne Krause   Task Name: Medical Complaint Callback   Assigned To: Torin Izquierdo   Regarding Patient: Gutierrez Huitron, Status: In Progress   Comment:    Aicha Hairston - 14 Dec 2017 4:06 PM     TASK CREATED  Pt called office today, left voicemail message  Pt's GAVIOTA is 5/6/18, GA 19-4  Pt saw Dr Lowell Deluna on 11/27/17, scheduled to see Dr Sreekanth Rinaldi on 12/27/17  Pt states "she has been having a weird pain for several days "   Pt states she can be reached @ [phone]  Thank you! MeetFritz - 14 Dec 2017 6:22 PM     TASK IN PROGRESS   MeetFritz - 14 Dec 2017 6:32 PM     TASK EDITED  returned pts' p c - pt states has been having lower back pain, primarily in the same spot  denies vag blding, LOF, states has +FM  encouraged pt to try wearing sneakers, or comfortable shoes, -no heels, increase water intake, take Tylenol, prn, sleep on left or right side instead of her back  if no relief, advised pt call back & will schedule a f/u apt for pt to be seen  Active Problems    1  Amenorrhea (626 0) (N91 2)   2  Back pain (724 5) (M54 9)   3  Encounter for gynecological examination without abnormal finding (V72 31) (Z01 419)   4  Encounter for supervision of other normal pregnancy in third trimester (V22 1) (Z34 83)   5  Headache (784 0) (R51)   6  Hyperemesis of pregnancy (643 00) (O21 0)   7  Lumbar radiculopathy (724 4) (M54 16)   8  Need for influenza vaccination (V04 81) (Z23)   9  Pregnancy, obstetrical care (V22 1) (Z34 90)   10  Screening for STD (sexually transmitted disease) (V74 5) (Z11 3)   11  Strain of thoracic region (847 1) (S29 019A)    Current Meds   1  Iron 325 (65 Fe) MG Oral Tablet; TAKE 1 TABLET DAILY AS DIRECTED; Therapy: (Recorded:05Oct2017) to Recorded   2  Metoclopramide HCl - 10 MG Oral Tablet (Reglan); Take 1 tablet PO Q 8 hrs, prn;    Therapy: 00ZUO6427 to (Evaluate:17Byp5983)  Requested for: 57ILB7472; Last   Rx:56Zkc6733 Ordered   3  Ondansetron 4 MG Oral Tablet Disintegrating; TAKE 1 TABLET Every 8 hours PRN; Therapy: 98JYS3766 to (Evaluate:70Fli6755)  Requested for: 42QES6681; Last   Rx:03Nov2017 Ordered   4  Prenatal Gummies/DHA & FA CHEW;   Therapy: (Recorded:12Lfv8651) to Recorded    Allergies    1  No Known Drug Allergies    2  No Known Food Allergies   3   Seasonal    Signatures   Electronically signed by : Jose Raul Pantoja RN; Dec 14 2017  6:32PM EST                       (Author)

## 2018-01-24 VITALS
DIASTOLIC BLOOD PRESSURE: 74 MMHG | HEIGHT: 62 IN | SYSTOLIC BLOOD PRESSURE: 122 MMHG | WEIGHT: 116 LBS | BODY MASS INDEX: 21.35 KG/M2

## 2018-01-29 ENCOUNTER — ROUTINE PRENATAL (OUTPATIENT)
Dept: OBGYN CLINIC | Facility: MEDICAL CENTER | Age: 23
End: 2018-01-29

## 2018-01-29 ENCOUNTER — TRANSCRIBE ORDERS (OUTPATIENT)
Dept: OBGYN CLINIC | Facility: MEDICAL CENTER | Age: 23
End: 2018-01-29

## 2018-01-29 VITALS — WEIGHT: 125.8 LBS | SYSTOLIC BLOOD PRESSURE: 120 MMHG | BODY MASS INDEX: 23.38 KG/M2 | DIASTOLIC BLOOD PRESSURE: 70 MMHG

## 2018-01-29 DIAGNOSIS — Z3A.26 26 WEEKS GESTATION OF PREGNANCY: Primary | ICD-10-CM

## 2018-01-29 DIAGNOSIS — M51.26 LUMBAR HERNIATED DISC: ICD-10-CM

## 2018-01-29 PROCEDURE — PNV: Performed by: OBSTETRICS & GYNECOLOGY

## 2018-01-29 NOTE — PATIENT INSTRUCTIONS
Please obtain your labs within next two weeks  Please call the  center to schedule consultation with Dr Jesse Ramsay to discuss epidural anesthesia

## 2018-01-29 NOTE — PROGRESS NOTES
It's a boy! Patient not taking PNV/Iron - too much nausea  Working on adding iron/etc through diet  GERD - tums daily, add Zantac daily  28wk lab slip given today  Level II US  - some missing views, has follow up at 28 weeks  Did not have genetic screening  Has concerns about epidural anesthesia, reports history of herniated disc, will provide referral to Dr Christina Melchor

## 2018-02-07 ENCOUNTER — TELEPHONE (OUTPATIENT)
Dept: NEUROLOGY | Facility: CLINIC | Age: 23
End: 2018-02-07

## 2018-02-08 ENCOUNTER — OFFICE VISIT (OUTPATIENT)
Dept: NEUROLOGY | Facility: CLINIC | Age: 23
End: 2018-02-08
Payer: COMMERCIAL

## 2018-02-08 VITALS
BODY MASS INDEX: 23.79 KG/M2 | SYSTOLIC BLOOD PRESSURE: 122 MMHG | DIASTOLIC BLOOD PRESSURE: 78 MMHG | WEIGHT: 126 LBS | HEIGHT: 61 IN | HEART RATE: 80 BPM

## 2018-02-08 DIAGNOSIS — M54.16 LUMBAR RADICULOPATHY: Primary | ICD-10-CM

## 2018-02-08 PROCEDURE — 99213 OFFICE O/P EST LOW 20 MIN: CPT | Performed by: PSYCHIATRY & NEUROLOGY
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
## 2018-02-09 ENCOUNTER — ULTRASOUND (OUTPATIENT)
Dept: PERINATAL CARE | Facility: MEDICAL CENTER | Age: 23
End: 2018-02-09
Payer: COMMERCIAL

## 2018-02-09 VITALS
HEART RATE: 80 BPM | HEIGHT: 61 IN | DIASTOLIC BLOOD PRESSURE: 80 MMHG | BODY MASS INDEX: 23.68 KG/M2 | SYSTOLIC BLOOD PRESSURE: 126 MMHG | WEIGHT: 125.4 LBS

## 2018-02-09 DIAGNOSIS — Z36.89 ENCOUNTER FOR FETAL ANATOMIC SURVEY: Primary | ICD-10-CM

## 2018-02-09 PROCEDURE — 76816 OB US FOLLOW-UP PER FETUS: CPT | Performed by: OBSTETRICS & GYNECOLOGY

## 2018-02-09 PROCEDURE — 99212 OFFICE O/P EST SF 10 MIN: CPT | Performed by: OBSTETRICS & GYNECOLOGY

## 2018-02-09 RX ORDER — CALCIUM CARBONATE 200(500)MG
1 TABLET,CHEWABLE ORAL 3 TIMES DAILY
COMMUNITY
End: 2018-06-01

## 2018-02-09 NOTE — PROGRESS NOTES
Please refer to the [facility] ultrasound report for additional information regarding the evaluation of Valeria in the [facility]  at [employer] today

## 2018-02-09 NOTE — LETTER
February 9, 2018     [address] [place] [address]    Patient: Roberto Salazar   YOB: 1995   Date of Visit: 2/9/2018       Dear Dr Terrence Rand: Thank you for referring Roberto Salazar to me for evaluation  Below are my notes for this consultation  If you have questions, please do not hesitate to call me  I look forward to following your patient along with you           Sincerely,        John Stokes MD        CC: No Recipients

## 2018-02-15 ENCOUNTER — APPOINTMENT (OUTPATIENT)
Dept: LAB | Facility: CLINIC | Age: 23
End: 2018-02-15
Payer: COMMERCIAL

## 2018-02-15 LAB
BASOPHILS # BLD AUTO: 0.02 THOUSANDS/ΜL (ref 0–0.1)
BASOPHILS NFR BLD AUTO: 0 % (ref 0–1)
EOSINOPHIL # BLD AUTO: 0.07 THOUSAND/ΜL (ref 0–0.61)
EOSINOPHIL NFR BLD AUTO: 1 % (ref 0–6)
ERYTHROCYTE [DISTWIDTH] IN BLOOD BY AUTOMATED COUNT: 12.9 % (ref 11.6–15.1)
GLUCOSE 1H P 50 G GLC PO SERPL-MCNC: 68 MG/DL
HCT VFR BLD AUTO: 36.4 % (ref 34.8–46.1)
HGB BLD-MCNC: 12.3 G/DL (ref 11.5–15.4)
LYMPHOCYTES # BLD AUTO: 1.48 THOUSANDS/ΜL (ref 0.6–4.47)
LYMPHOCYTES NFR BLD AUTO: 17 % (ref 14–44)
MCH RBC QN AUTO: 28.2 PG (ref 26.8–34.3)
MCHC RBC AUTO-ENTMCNC: 33.8 G/DL (ref 31.4–37.4)
MCV RBC AUTO: 84 FL (ref 82–98)
MONOCYTES # BLD AUTO: 0.97 THOUSAND/ΜL (ref 0.17–1.22)
MONOCYTES NFR BLD AUTO: 11 % (ref 4–12)
NEUTROPHILS # BLD AUTO: 6.3 THOUSANDS/ΜL (ref 1.85–7.62)
NEUTS SEG NFR BLD AUTO: 71 % (ref 43–75)
NRBC BLD AUTO-RTO: 0 /100 WBCS
PLATELET # BLD AUTO: 271 THOUSANDS/UL (ref 149–390)
PMV BLD AUTO: 10.2 FL (ref 8.9–12.7)
RBC # BLD AUTO: 4.36 MILLION/UL (ref 3.81–5.12)
WBC # BLD AUTO: 8.88 THOUSAND/UL (ref 4.31–10.16)

## 2018-02-15 PROCEDURE — 86592 SYPHILIS TEST NON-TREP QUAL: CPT | Performed by: OBSTETRICS & GYNECOLOGY

## 2018-02-15 PROCEDURE — 82950 GLUCOSE TEST: CPT | Performed by: OBSTETRICS & GYNECOLOGY

## 2018-02-15 PROCEDURE — 85025 COMPLETE CBC W/AUTO DIFF WBC: CPT | Performed by: OBSTETRICS & GYNECOLOGY

## 2018-02-15 PROCEDURE — 36415 COLL VENOUS BLD VENIPUNCTURE: CPT | Performed by: OBSTETRICS & GYNECOLOGY

## 2018-02-16 LAB — RPR SER QL: NORMAL

## 2018-02-28 ENCOUNTER — ROUTINE PRENATAL (OUTPATIENT)
Dept: OBGYN CLINIC | Facility: MEDICAL CENTER | Age: 23
End: 2018-02-28

## 2018-02-28 VITALS
BODY MASS INDEX: 25 KG/M2 | RESPIRATION RATE: 14 BRPM | DIASTOLIC BLOOD PRESSURE: 62 MMHG | WEIGHT: 132.4 LBS | HEIGHT: 61 IN | SYSTOLIC BLOOD PRESSURE: 118 MMHG

## 2018-02-28 DIAGNOSIS — Z34.02 ENCOUNTER FOR PRENATAL CARE IN SECOND TRIMESTER OF FIRST PREGNANCY: Primary | ICD-10-CM

## 2018-02-28 PROCEDURE — PNV: Performed by: OBSTETRICS & GYNECOLOGY

## 2018-02-28 NOTE — PROGRESS NOTES
Patient is a 65-year-old female para 0 at 30 weeks and 3 days here for routine prenatal visit  Patient reports normal fetal movement  Denies rupture membranes vaginal bleeding or regular contractions

## 2018-03-07 NOTE — PROGRESS NOTES
Education  Baby & Me Education 1st Trimester:   First Trimester Education provided:   benefits of breastfeeding, importance of exclusive breastfeeding, early initiation of breastfeeding and exclusive breastfeeding for the first 6 months   The patient is planning on breastfeeding  Prenatal education provided by: Devang Yao MA      Signatures   Electronically signed by :  Rome Luna, ; Oct 13 2017  8:35AM EST                       (Co-author)

## 2018-03-14 ENCOUNTER — ROUTINE PRENATAL (OUTPATIENT)
Dept: OBGYN CLINIC | Facility: CLINIC | Age: 23
End: 2018-03-14
Payer: COMMERCIAL

## 2018-03-14 VITALS — WEIGHT: 135 LBS | DIASTOLIC BLOOD PRESSURE: 72 MMHG | BODY MASS INDEX: 25.51 KG/M2 | SYSTOLIC BLOOD PRESSURE: 108 MMHG

## 2018-03-14 DIAGNOSIS — Z34.03 ENCOUNTER FOR SUPERVISION OF NORMAL FIRST PREGNANCY IN THIRD TRIMESTER: Primary | ICD-10-CM

## 2018-03-14 PROCEDURE — 90715 TDAP VACCINE 7 YRS/> IM: CPT

## 2018-03-14 PROCEDURE — PNV: Performed by: PHYSICIAN ASSISTANT

## 2018-03-14 PROCEDURE — 90471 IMMUNIZATION ADMIN: CPT

## 2018-03-14 NOTE — ASSESSMENT & PLAN NOTE
RTO 2 weeks  tdap given today  For 32 wk growth US, has scheduled with [facility]  Reviewed PTL precautions, fetal kick counts, and reasons to call

## 2018-03-14 NOTE — PROGRESS NOTES
Patient w/o complaints  (+) good fetal movement, denies any bleeding, fluid leakage or ctx  28wk US, 12th percentile for overall fetal weight, 5th percentile for abdominal circumference, patient for 32wk growth US to check interval growth      Problem List Items Addressed This Visit     Encounter for supervision of normal first pregnancy in third trimester - Primary     RTO 2 weeks  tdap given today  For 32 wk growth US, has scheduled with [facility]  Reviewed PTL precautions, fetal kick counts, and reasons to call

## 2018-04-02 ENCOUNTER — ROUTINE PRENATAL (OUTPATIENT)
Dept: OBGYN CLINIC | Facility: MEDICAL CENTER | Age: 23
End: 2018-04-02

## 2018-04-02 VITALS — SYSTOLIC BLOOD PRESSURE: 118 MMHG | DIASTOLIC BLOOD PRESSURE: 80 MMHG | WEIGHT: 139 LBS | BODY MASS INDEX: 26.26 KG/M2

## 2018-04-02 DIAGNOSIS — Z34.03 ENCOUNTER FOR SUPERVISION OF NORMAL FIRST PREGNANCY IN THIRD TRIMESTER: Primary | ICD-10-CM

## 2018-04-02 PROCEDURE — PNV: Performed by: PHYSICIAN ASSISTANT
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
## 2018-04-02 NOTE — ASSESSMENT & PLAN NOTE
RTO 1 week  GBS next visit  For [facility] f/u growth US  Reviewed PTL precautions, fetal kick counts and reasons to call

## 2018-04-04 ENCOUNTER — APPOINTMENT (OUTPATIENT)
Dept: PERINATAL CARE | Facility: CLINIC | Age: 23
End: 2018-04-04
Payer: COMMERCIAL

## 2018-04-04 ENCOUNTER — ULTRASOUND (OUTPATIENT)
Dept: PERINATAL CARE | Facility: CLINIC | Age: 23
End: 2018-04-04
Payer: COMMERCIAL

## 2018-04-04 VITALS
BODY MASS INDEX: 26.32 KG/M2 | DIASTOLIC BLOOD PRESSURE: 76 MMHG | SYSTOLIC BLOOD PRESSURE: 123 MMHG | HEIGHT: 61 IN | HEART RATE: 79 BPM | WEIGHT: 139.4 LBS

## 2018-04-04 DIAGNOSIS — M51.26 LUMBAR HERNIATED DISC: ICD-10-CM

## 2018-04-04 DIAGNOSIS — Z3A.35 35 WEEKS GESTATION OF PREGNANCY: ICD-10-CM

## 2018-04-04 DIAGNOSIS — O36.5930 POOR FETAL GROWTH AFFECTING MANAGEMENT OF MOTHER IN THIRD TRIMESTER, SINGLE OR UNSPECIFIED FETUS: Primary | ICD-10-CM

## 2018-04-04 PROCEDURE — 59025 FETAL NON-STRESS TEST: CPT | Performed by: OBSTETRICS & GYNECOLOGY

## 2018-04-04 PROCEDURE — 76816 OB US FOLLOW-UP PER FETUS: CPT | Performed by: OBSTETRICS & GYNECOLOGY

## 2018-04-04 PROCEDURE — 99212 OFFICE O/P EST SF 10 MIN: CPT | Performed by: OBSTETRICS & GYNECOLOGY

## 2018-04-04 PROCEDURE — 76820 UMBILICAL ARTERY ECHO: CPT | Performed by: OBSTETRICS & GYNECOLOGY

## 2018-04-10 ENCOUNTER — ROUTINE PRENATAL (OUTPATIENT)
Dept: PERINATAL CARE | Facility: CLINIC | Age: 23
End: 2018-04-10
Payer: COMMERCIAL

## 2018-04-10 ENCOUNTER — ROUTINE PRENATAL (OUTPATIENT)
Dept: OBGYN CLINIC | Facility: MEDICAL CENTER | Age: 23
End: 2018-04-10

## 2018-04-10 VITALS
SYSTOLIC BLOOD PRESSURE: 137 MMHG | HEIGHT: 61 IN | DIASTOLIC BLOOD PRESSURE: 89 MMHG | BODY MASS INDEX: 26.66 KG/M2 | WEIGHT: 141.2 LBS

## 2018-04-10 VITALS — DIASTOLIC BLOOD PRESSURE: 70 MMHG | SYSTOLIC BLOOD PRESSURE: 114 MMHG | WEIGHT: 141 LBS | BODY MASS INDEX: 26.64 KG/M2

## 2018-04-10 DIAGNOSIS — Z34.03 ENCOUNTER FOR SUPERVISION OF NORMAL FIRST PREGNANCY IN THIRD TRIMESTER: ICD-10-CM

## 2018-04-10 DIAGNOSIS — O36.5930 POOR FETAL GROWTH AFFECTING MANAGEMENT OF MOTHER IN THIRD TRIMESTER, SINGLE OR UNSPECIFIED FETUS: Primary | ICD-10-CM

## 2018-04-10 PROBLEM — Z3A.35 35 WEEKS GESTATION OF PREGNANCY: Status: RESOLVED | Noted: 2018-04-04 | Resolved: 2018-04-10

## 2018-04-10 PROCEDURE — 59025 FETAL NON-STRESS TEST: CPT | Performed by: OBSTETRICS & GYNECOLOGY

## 2018-04-10 PROCEDURE — PNV: Performed by: NURSE PRACTITIONER

## 2018-04-10 PROCEDURE — 76820 UMBILICAL ARTERY ECHO: CPT | Performed by: OBSTETRICS & GYNECOLOGY

## 2018-04-10 PROCEDURE — 76815 OB US LIMITED FETUS(S): CPT | Performed by: OBSTETRICS & GYNECOLOGY

## 2018-04-10 PROCEDURE — 87653 STREP B DNA AMP PROBE: CPT | Performed by: NURSE PRACTITIONER

## 2018-04-10 PROCEDURE — 99212 OFFICE O/P EST SF 10 MIN: CPT | Performed by: OBSTETRICS & GYNECOLOGY

## 2018-04-10 NOTE — PATIENT INSTRUCTIONS
Thank you for choosing [facility] for your  care today  If you have any questions about your ultrasound or care, please do not hesitate to contact us or your primary obstetrician  Please return in 3 days for your next ultrasound to check on the fetal nonstress test   Dopplers should continue weekly until delivery  Future Appointments  Date Time Provider Rosalia Araiza   4/10/2018 2:00 PM Shannan Yeung [place], [address]  [facility] [place] [facility]   2018 1:00 PM Ab Painting   2018 2:20 PM ANSELMO [facility]   2018 12:00 PM Vidal Vaughn 42   2018 1:40 PM Dominga Robledo MD [facility]     I will check on your due date (18 or 18) as this is very important to your management

## 2018-04-10 NOTE — PROGRESS NOTES
Problem List Items Addressed This Visit     Encounter for supervision of normal first pregnancy in third trimester     Denies LOF, VB, CTX  Good fetal movement  GBS collected today  PTL precautions reviewed  Relevant Orders    Strep B DNA probe, amplification    Poor fetal growth affecting management of mother in third trimester - Primary     Had NST, PATSY, Dopplers at [facility] earlier today for IUGR  Reactive NST  Awaiting remainder of notes                 Marissa Wiley

## 2018-04-10 NOTE — Clinical Note
CHELSY Sierra, I need to check on this patient's due date  She is seeing you today  WE have 4/26 in our sytem, and EPIC has 5/6/18  Will get back to you as obv this is very important  Thanks!  teresa

## 2018-04-10 NOTE — ASSESSMENT & PLAN NOTE
Had NST, PATSY, Dopplers at [facility] earlier today for IUGR  Reactive NST  Awaiting remainder of notes

## 2018-04-10 NOTE — PROGRESS NOTES
[address]: Ms Jhon Mackey was seen today at 36w2d for PATSY, Dopplers and NST  Cervix was checked at her request; chaperoned exam reveals fingertip dilated, posterior, firm cervix  Labor precautions were reviewed  Please don't hesitate to contact our office with any concerns or questions    Keyon Dalton MD

## 2018-04-13 ENCOUNTER — ROUTINE PRENATAL (OUTPATIENT)
Dept: PERINATAL CARE | Facility: CLINIC | Age: 23
End: 2018-04-13
Payer: COMMERCIAL

## 2018-04-13 ENCOUNTER — APPOINTMENT (OUTPATIENT)
Dept: PERINATAL CARE | Facility: CLINIC | Age: 23
End: 2018-04-13
Payer: COMMERCIAL

## 2018-04-13 VITALS
SYSTOLIC BLOOD PRESSURE: 124 MMHG | HEIGHT: 61 IN | WEIGHT: 141 LBS | BODY MASS INDEX: 26.62 KG/M2 | HEART RATE: 83 BPM | DIASTOLIC BLOOD PRESSURE: 72 MMHG

## 2018-04-13 DIAGNOSIS — Z3A.36 36 WEEKS GESTATION OF PREGNANCY: ICD-10-CM

## 2018-04-13 DIAGNOSIS — O36.5930 POOR FETAL GROWTH AFFECTING MANAGEMENT OF MOTHER IN THIRD TRIMESTER, SINGLE OR UNSPECIFIED FETUS: Primary | ICD-10-CM

## 2018-04-13 LAB — GP B STREP DNA SPEC QL NAA+PROBE: NORMAL

## 2018-04-13 PROCEDURE — 59025 FETAL NON-STRESS TEST: CPT | Performed by: OBSTETRICS & GYNECOLOGY

## 2018-04-13 NOTE — Clinical Note
Yanelis Taylor, This patient came for NST today and first BP was elevated  Second was normal   She has IUGR and I tried calling her to check in on symptoms but there was no answer  IF you guys could be on the lookout for her or if anyone has time to check in by phone over the weekend, that would be great; all other Bps on her chart are octavio  Thank you!  teresa

## 2018-04-17 ENCOUNTER — ROUTINE PRENATAL (OUTPATIENT)
Dept: OBGYN CLINIC | Age: 23
End: 2018-04-17

## 2018-04-17 VITALS — SYSTOLIC BLOOD PRESSURE: 120 MMHG | WEIGHT: 144 LBS | BODY MASS INDEX: 27.21 KG/M2 | DIASTOLIC BLOOD PRESSURE: 62 MMHG

## 2018-04-17 DIAGNOSIS — O36.5930 POOR FETAL GROWTH AFFECTING MANAGEMENT OF MOTHER IN THIRD TRIMESTER, SINGLE OR UNSPECIFIED FETUS: ICD-10-CM

## 2018-04-17 DIAGNOSIS — Z3A.37 37 WEEKS GESTATION OF PREGNANCY: ICD-10-CM

## 2018-04-17 DIAGNOSIS — Z34.83 ENCOUNTER FOR SUPERVISION OF OTHER NORMAL PREGNANCY, THIRD TRIMESTER: Primary | ICD-10-CM

## 2018-04-17 PROCEDURE — PNV: Performed by: NURSE PRACTITIONER

## 2018-04-17 NOTE — PATIENT INSTRUCTIONS
Pregnancy at 28 to 38 Weeks   AMBULATORY CARE:   What changes are happening to your body: You are considered full term at the beginning of 37 weeks  Your breathing may be easier if your baby has moved down into a head-down position  You may need to urinate more often because the baby may be pressing on your bladder  You may also feel more discomfort and get tired easily  Seek care immediately if:   · You develop a severe headache that does not go away  · You have new or increased vision changes, such as blurred or spotted vision  · You have new or increased swelling in your face or hands  · You have vaginal spotting or bleeding  · Your water broke or you feel warm water gushing or trickling from your vagina  Contact your healthcare provider if:   · You have more than 5 contractions in 1 hour  · You notice any changes in your baby's movements  · You have abdominal cramps, pressure, or tightening  · You have a change in vaginal discharge  · You have chills or a fever  · You have vaginal itching, burning, or pain  · You have yellow, green, white, or foul-smelling vaginal discharge  · You have pain or burning when you urinate, less urine than usual, or pink or bloody urine  · You have questions or concerns about your condition or care  How to care for yourself at this stage of your pregnancy:   · Eat a variety of healthy foods  Healthy foods include fruits, vegetables, whole-grain breads, low-fat dairy foods, beans, lean meats, and fish  Drink liquids as directed  Ask how much liquid to drink each day and which liquids are best for you  Limit caffeine to less than 200 milligrams each day  Limit your intake of fish to 2 servings each week  Choose fish low in mercury such as canned light tuna, shrimp, crab, salmon, cod, or tilapia  Do not  eat fish high in mercury such as swordfish, tilefish, orquidea mackerel, and shark  · Take prenatal vitamins as directed    Your need for certain vitamins and minerals, such as folic acid, increases during pregnancy  Prenatal vitamins provide some of the extra vitamins and minerals you need  Prenatal vitamins may also help to decrease the risk of certain birth defects  · Rest as needed  Put your feet up if you have swelling in your ankles and feet  · Do not smoke  If you smoke, it is never too late to quit  Smoking increases your risk of a miscarriage and other health problems during your pregnancy  Smoking can cause your baby to be born too early or weigh less at birth  Ask your healthcare provider for information if you need help quitting  · Do not drink alcohol  Alcohol passes from your body to your baby through the placenta  It can affect your baby's brain development and cause fetal alcohol syndrome (FAS)  FAS is a group of conditions that causes mental, behavior, and growth problems  · Talk to your healthcare provider before you take any medicines  Many medicines may harm your baby if you take them when you are pregnant  Do not take any medicines, vitamins, herbs, or supplements without first talking to your healthcare provider  Never use illegal or street drugs (such as marijuana or cocaine) while you are pregnant  · Talk to your healthcare provider before you travel  You may not be able to travel in an airplane after 36 weeks  He may also recommend that you avoid long road trips  Safety tips during pregnancy:   · Avoid hot tubs and saunas  Do not use a hot tub or sauna while you are pregnant, especially during your first trimester  Hot tubs and saunas may raise your baby's temperature and increase the risk of birth defects  · Avoid toxoplasmosis  This is an infection caused by eating raw meat or being around infected cat feces  It can cause birth defects, miscarriages, and other problems  Wash your hands after you touch raw meat  Make sure any meat is well-cooked before you eat it   Avoid raw eggs and unpasteurized milk  Use gloves or ask someone else to clean your cat's litter box while you are pregnant  · Ask your healthcare provider about travel  The most comfortable time to travel is during the second trimester  Ask your healthcare provider if you can travel after 36 weeks  You may not be able to travel in an airplane after 36 weeks  He may also recommend that you avoid long road trips  Changes that are happening with your baby:  By 38 weeks, your baby may weigh between 6 and 9 pounds  Your baby may be about 14 inches long from the top of the head to the rump (baby's bottom)  Your baby hears well enough to know your voice  As your baby gets larger, you may feel fewer kicks and more stretching and rolling  Your baby may move into a head-down position  Your baby will also rest lower in your abdomen  What you need to know about prenatal care: Your healthcare provider will check your blood pressure and weight  You may also need the following:  · A urine test  may also be done to check for sugar and protein  These can be signs of gestational diabetes or infection  Protein in your urine may also be a sign of preeclampsia  Preeclampsia is a condition that can develop during week 20 or later of your pregnancy  It causes high blood pressure, and it can cause problems with your kidneys and other organs  · A blood test  may be done to check for anemia (low iron level)  · A Tdap vaccine  may be recommended by your healthcare provider  · A group B strep test  is a test that is done to check for group B strep infection  Group B strep is a type of bacteria that may be found in the vagina or rectum  It can be passed to your baby during delivery if you have it  Your healthcare provider will take swab your vagina or rectum and send the sample to the lab for tests  · Fundal height  is a measurement of your uterus to check your baby's growth   This number is usually the same as the number of weeks that you have been pregnant  Your healthcare provider may also check your baby's position  · Your baby's heart rate  will be checked  © 2017 [address] [employer] Information is for End User's use only and may not be sold, redistributed or otherwise used for commercial purposes  All illustrations and images included in CareNotes® are the copyrighted property of A D A M [employer] Inc  or Jose Luis Ledezma  The above information is an  only  It is not intended as medical advice for individual conditions or treatments  Talk to your doctor, nurse or pharmacist before following any medical regimen to see if it is safe and effective for you

## 2018-04-17 NOTE — PROGRESS NOTES
Problem List Items Addressed This Visit     Encounter for supervision of other normal pregnancy, third trimester - Primary    RESOLVED: 35 weeks gestation of pregnancy    Poor fetal growth affecting management of mother in third trimester        Feels well, started having nausea towards end of visit-will go home to rest and call if worsens  Denies LOF/CTX/VB  NST tomorrow for IUGR  Plan to deliver by 38-39 weeks  FG 8%  GBS negative

## 2018-04-18 ENCOUNTER — ANESTHESIA EVENT (INPATIENT)
Dept: LABOR AND DELIVERY | Facility: HOSPITAL | Age: 23
End: 2018-04-18
Payer: COMMERCIAL

## 2018-04-18 ENCOUNTER — ANESTHESIA (INPATIENT)
Dept: LABOR AND DELIVERY | Facility: HOSPITAL | Age: 23
End: 2018-04-18
Payer: COMMERCIAL

## 2018-04-18 ENCOUNTER — CLINICAL SUPPORT (OUTPATIENT)
Dept: PERINATAL CARE | Facility: CLINIC | Age: 23
End: 2018-04-18
Payer: COMMERCIAL

## 2018-04-18 ENCOUNTER — ULTRASOUND (OUTPATIENT)
Dept: PERINATAL CARE | Facility: CLINIC | Age: 23
End: 2018-04-18
Payer: COMMERCIAL

## 2018-04-18 ENCOUNTER — HOSPITAL ENCOUNTER (INPATIENT)
Facility: HOSPITAL | Age: 23
LOS: 4 days | Discharge: HOME | End: 2018-04-22
Attending: OBSTETRICS & GYNECOLOGY | Admitting: OBSTETRICS & GYNECOLOGY
Payer: COMMERCIAL

## 2018-04-18 VITALS
DIASTOLIC BLOOD PRESSURE: 86 MMHG | HEIGHT: 61 IN | BODY MASS INDEX: 27.03 KG/M2 | SYSTOLIC BLOOD PRESSURE: 133 MMHG | HEART RATE: 90 BPM | WEIGHT: 143.2 LBS

## 2018-04-18 VITALS
SYSTOLIC BLOOD PRESSURE: 133 MMHG | WEIGHT: 143.2 LBS | BODY MASS INDEX: 26.35 KG/M2 | HEART RATE: 90 BPM | DIASTOLIC BLOOD PRESSURE: 86 MMHG | HEIGHT: 62 IN

## 2018-04-18 DIAGNOSIS — Z36.9 ENCOUNTER FOR ANTENATAL SCREENING OF MOTHER: Primary | ICD-10-CM

## 2018-04-18 DIAGNOSIS — Z3A.37 37 WEEKS GESTATION OF PREGNANCY: ICD-10-CM

## 2018-04-18 DIAGNOSIS — O36.5930 POOR FETAL GROWTH AFFECTING MANAGEMENT OF MOTHER IN THIRD TRIMESTER: ICD-10-CM

## 2018-04-18 DIAGNOSIS — O36.5930 POOR FETAL GROWTH AFFECTING MANAGEMENT OF MOTHER IN THIRD TRIMESTER, SINGLE OR UNSPECIFIED FETUS: Primary | ICD-10-CM

## 2018-04-18 DIAGNOSIS — Z34.83 ENCOUNTER FOR SUPERVISION OF OTHER NORMAL PREGNANCY, THIRD TRIMESTER: ICD-10-CM

## 2018-04-18 LAB
ABO GROUP BLD: NORMAL
ALBUMIN SERPL BCP-MCNC: 2.6 G/DL (ref 3.5–5)
ALP SERPL-CCNC: 154 U/L (ref 46–116)
ALT SERPL W P-5'-P-CCNC: 20 U/L (ref 12–78)
ANION GAP SERPL CALCULATED.3IONS-SCNC: 9 MMOL/L (ref 4–13)
AST SERPL W P-5'-P-CCNC: 20 U/L (ref 5–45)
BASOPHILS # BLD AUTO: 0.02 THOUSANDS/ΜL (ref 0–0.1)
BASOPHILS NFR BLD AUTO: 0 % (ref 0–1)
BILIRUB SERPL-MCNC: 0.33 MG/DL (ref 0.2–1)
BLD GP AB SCN SERPL QL: NEGATIVE
BUN SERPL-MCNC: 6 MG/DL (ref 5–25)
CALCIUM SERPL-MCNC: 8.7 MG/DL (ref 8.3–10.1)
CHLORIDE SERPL-SCNC: 105 MMOL/L (ref 100–108)
CO2 SERPL-SCNC: 24 MMOL/L (ref 21–32)
CREAT SERPL-MCNC: 0.48 MG/DL (ref 0.6–1.3)
EOSINOPHIL # BLD AUTO: 0.06 THOUSAND/ΜL (ref 0–0.61)
EOSINOPHIL NFR BLD AUTO: 1 % (ref 0–6)
ERYTHROCYTE [DISTWIDTH] IN BLOOD BY AUTOMATED COUNT: 13.2 % (ref 11.6–15.1)
GFR SERPL CREATININE-BSD FRML MDRD: 139 ML/MIN/1.73SQ M
GLUCOSE SERPL-MCNC: 75 MG/DL (ref 65–140)
HCT VFR BLD AUTO: 35.4 % (ref 34.8–46.1)
HGB BLD-MCNC: 12.1 G/DL (ref 11.5–15.4)
LYMPHOCYTES # BLD AUTO: 1.56 THOUSANDS/ΜL (ref 0.6–4.47)
LYMPHOCYTES NFR BLD AUTO: 21 % (ref 14–44)
MCH RBC QN AUTO: 27.9 PG (ref 26.8–34.3)
MCHC RBC AUTO-ENTMCNC: 34.2 G/DL (ref 31.4–37.4)
MCV RBC AUTO: 82 FL (ref 82–98)
MONOCYTES # BLD AUTO: 0.92 THOUSAND/ΜL (ref 0.17–1.22)
MONOCYTES NFR BLD AUTO: 12 % (ref 4–12)
NEUTROPHILS # BLD AUTO: 4.86 THOUSANDS/ΜL (ref 1.85–7.62)
NEUTS SEG NFR BLD AUTO: 66 % (ref 43–75)
NRBC BLD AUTO-RTO: 0 /100 WBCS
PLATELET # BLD AUTO: 239 THOUSANDS/UL (ref 149–390)
PMV BLD AUTO: 10.8 FL (ref 8.9–12.7)
POTASSIUM SERPL-SCNC: 3.8 MMOL/L (ref 3.5–5.3)
PROT SERPL-MCNC: 7.2 G/DL (ref 6.4–8.2)
RBC # BLD AUTO: 4.33 MILLION/UL (ref 3.81–5.12)
RH BLD: POSITIVE
SODIUM SERPL-SCNC: 138 MMOL/L (ref 136–145)
SPECIMEN EXPIRATION DATE: NORMAL
URATE SERPL-MCNC: 3.6 MG/DL (ref 2–6.8)
WBC # BLD AUTO: 7.44 THOUSAND/UL (ref 4.31–10.16)

## 2018-04-18 PROCEDURE — 4A1HXCZ MONITORING OF PRODUCTS OF CONCEPTION, CARDIAC RATE, EXTERNAL APPROACH: ICD-10-PCS | Performed by: OBSTETRICS & GYNECOLOGY

## 2018-04-18 PROCEDURE — 85025 COMPLETE CBC W/AUTO DIFF WBC: CPT | Performed by: OBSTETRICS & GYNECOLOGY

## 2018-04-18 PROCEDURE — 59025 FETAL NON-STRESS TEST: CPT | Performed by: OBSTETRICS & GYNECOLOGY

## 2018-04-18 PROCEDURE — 84550 ASSAY OF BLOOD/URIC ACID: CPT | Performed by: OBSTETRICS & GYNECOLOGY

## 2018-04-18 PROCEDURE — 99212 OFFICE O/P EST SF 10 MIN: CPT | Performed by: OBSTETRICS & GYNECOLOGY

## 2018-04-18 PROCEDURE — 80053 COMPREHEN METABOLIC PANEL: CPT | Performed by: OBSTETRICS & GYNECOLOGY

## 2018-04-18 PROCEDURE — 76821 MIDDLE CEREBRAL ARTERY ECHO: CPT | Performed by: OBSTETRICS & GYNECOLOGY

## 2018-04-18 PROCEDURE — 76816 OB US FOLLOW-UP PER FETUS: CPT | Performed by: OBSTETRICS & GYNECOLOGY

## 2018-04-18 PROCEDURE — 86850 RBC ANTIBODY SCREEN: CPT | Performed by: OBSTETRICS & GYNECOLOGY

## 2018-04-18 PROCEDURE — 76820 UMBILICAL ARTERY ECHO: CPT | Performed by: OBSTETRICS & GYNECOLOGY

## 2018-04-18 PROCEDURE — 86901 BLOOD TYPING SEROLOGIC RH(D): CPT | Performed by: OBSTETRICS & GYNECOLOGY

## 2018-04-18 PROCEDURE — 86900 BLOOD TYPING SEROLOGIC ABO: CPT | Performed by: OBSTETRICS & GYNECOLOGY

## 2018-04-18 RX ORDER — CALCIUM CARBONATE 200(500)MG
500 TABLET,CHEWABLE ORAL 3 TIMES DAILY
Status: DISCONTINUED | OUTPATIENT
Start: 2018-04-18 | End: 2018-04-20

## 2018-04-18 RX ORDER — SODIUM CHLORIDE, SODIUM LACTATE, POTASSIUM CHLORIDE, CALCIUM CHLORIDE 600; 310; 30; 20 MG/100ML; MG/100ML; MG/100ML; MG/100ML
125 INJECTION, SOLUTION INTRAVENOUS CONTINUOUS
Status: DISCONTINUED | OUTPATIENT
Start: 2018-04-18 | End: 2018-04-20

## 2018-04-18 RX ADMIN — SODIUM CHLORIDE, SODIUM LACTATE, POTASSIUM CHLORIDE, AND CALCIUM CHLORIDE 125 ML/HR: .6; .31; .03; .02 INJECTION, SOLUTION INTRAVENOUS at 15:45
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
## 2018-04-19 PROCEDURE — 3E033VJ INTRODUCTION OF OTHER HORMONE INTO PERIPHERAL VEIN, PERCUTANEOUS APPROACH: ICD-10-PCS | Performed by: OBSTETRICS & GYNECOLOGY

## 2018-04-19 PROCEDURE — 10907ZC DRAINAGE OF AMNIOTIC FLUID, THERAPEUTIC FROM PRODUCTS OF CONCEPTION, VIA NATURAL OR ARTIFICIAL OPENING: ICD-10-PCS | Performed by: OBSTETRICS & GYNECOLOGY

## 2018-04-19 RX ORDER — FENTANYL CITRATE 50 UG/ML
INJECTION, SOLUTION INTRAMUSCULAR; INTRAVENOUS AS NEEDED
Status: DISCONTINUED | OUTPATIENT
Start: 2018-04-19 | End: 2018-04-20 | Stop reason: SURG

## 2018-04-19 RX ORDER — DIPHENHYDRAMINE HYDROCHLORIDE 50 MG/ML
25 INJECTION INTRAMUSCULAR; INTRAVENOUS EVERY 6 HOURS PRN
Status: DISCONTINUED | OUTPATIENT
Start: 2018-04-19 | End: 2018-04-20

## 2018-04-19 RX ORDER — NALBUPHINE HCL 10 MG/ML
5 AMPUL (ML) INJECTION
Status: DISCONTINUED | OUTPATIENT
Start: 2018-04-19 | End: 2018-04-20

## 2018-04-19 RX ORDER — FENTANYL CITRATE 50 UG/ML
INJECTION, SOLUTION INTRAMUSCULAR; INTRAVENOUS
Status: COMPLETED
Start: 2018-04-19 | End: 2018-04-19

## 2018-04-19 RX ORDER — BUPIVACAINE HYDROCHLORIDE 2.5 MG/ML
INJECTION, SOLUTION INFILTRATION; PERINEURAL AS NEEDED
Status: DISCONTINUED | OUTPATIENT
Start: 2018-04-19 | End: 2018-04-20 | Stop reason: SURG

## 2018-04-19 RX ORDER — METOCLOPRAMIDE HYDROCHLORIDE 5 MG/ML
5 INJECTION INTRAMUSCULAR; INTRAVENOUS EVERY 6 HOURS PRN
Status: DISCONTINUED | OUTPATIENT
Start: 2018-04-19 | End: 2018-04-20

## 2018-04-19 RX ORDER — OXYTOCIN/RINGER'S LACTATE 30/500 ML
PLASTIC BAG, INJECTION (ML) INTRAVENOUS
Status: COMPLETED
Start: 2018-04-19 | End: 2018-04-19

## 2018-04-19 RX ORDER — OXYTOCIN/RINGER'S LACTATE 30/500 ML
1-30 PLASTIC BAG, INJECTION (ML) INTRAVENOUS
Status: DISCONTINUED | OUTPATIENT
Start: 2018-04-19 | End: 2018-04-20

## 2018-04-19 RX ORDER — ONDANSETRON 2 MG/ML
4 INJECTION INTRAMUSCULAR; INTRAVENOUS EVERY 4 HOURS PRN
Status: DISCONTINUED | OUTPATIENT
Start: 2018-04-19 | End: 2018-04-20

## 2018-04-19 RX ADMIN — Medication: at 19:20

## 2018-04-19 RX ADMIN — SODIUM CHLORIDE, SODIUM LACTATE, POTASSIUM CHLORIDE, AND CALCIUM CHLORIDE 125 ML/HR: .6; .31; .03; .02 INJECTION, SOLUTION INTRAVENOUS at 14:09

## 2018-04-19 RX ADMIN — Medication 2 MILLI-UNITS/MIN: at 14:18

## 2018-04-19 RX ADMIN — BUPIVACAINE HYDROCHLORIDE 1 ML: 2.5 INJECTION, SOLUTION INFILTRATION; PERINEURAL at 19:21

## 2018-04-19 RX ADMIN — NALBUPHINE HYDROCHLORIDE 5 MG: 10 INJECTION, SOLUTION INTRAMUSCULAR; INTRAVENOUS; SUBCUTANEOUS at 20:39

## 2018-04-19 RX ADMIN — DIPHENHYDRAMINE HYDROCHLORIDE 25 MG: 50 INJECTION, SOLUTION INTRAMUSCULAR; INTRAVENOUS at 19:58

## 2018-04-19 RX ADMIN — ROPIVACAINE HYDROCHLORIDE: 2 INJECTION, SOLUTION EPIDURAL; INFILTRATION at 20:36

## 2018-04-19 RX ADMIN — SODIUM CHLORIDE, SODIUM LACTATE, POTASSIUM CHLORIDE, AND CALCIUM CHLORIDE 999 ML/HR: .6; .31; .03; .02 INJECTION, SOLUTION INTRAVENOUS at 23:13

## 2018-04-19 RX ADMIN — SODIUM CHLORIDE, SODIUM LACTATE, POTASSIUM CHLORIDE, AND CALCIUM CHLORIDE 999 ML/HR: .6; .31; .03; .02 INJECTION, SOLUTION INTRAVENOUS at 17:34

## 2018-04-19 RX ADMIN — FENTANYL CITRATE 10 MCG: 50 INJECTION, SOLUTION INTRAMUSCULAR; INTRAVENOUS at 19:21

## 2018-04-19 NOTE — ANESTHESIA PREPROCEDURE EVALUATION
Review of Systems/Medical History  Patient summary reviewed  Chart reviewed  No history of anesthetic complications     Cardiovascular  Negative cardio ROS    Pulmonary  Negative pulmonary ROS        GI/Hepatic  Negative GI/hepatic ROS          Negative  ROS        Endo/Other  Negative endo/other ROS      GYN  Currently pregnant , Prior pregnancy/OB history : 2 Parity: 0,          Hematology  Negative hematology ROS      Musculoskeletal  Back pain , lumbar pain,        Neurology  Negative neurology ROS      Psychology   Anxiety, Depression ,              Physical Exam    Airway    Mallampati score: II  TM Distance: >3 FB  Neck ROM: full     Dental   No notable dental hx     Cardiovascular  Comment: Negative ROS, Rhythm: regular, Rate: normal, Cardiovascular exam normal    Pulmonary  Pulmonary exam normal Breath sounds clear to auscultation,     Other Findings        Anesthesia Plan  ASA Score- 2     Anesthesia Type- epidural and spinal with ASA Monitors  Additional Monitors:   Airway Plan:         Plan Factors-    Induction-     Postoperative Plan-     Informed Consent- Anesthetic plan and risks discussed with patient  Recent labs personally reviewed:  Lab Results   Component Value Date    WBC 7 44 2018    HGB 12 1 2018     2018     Lab Results   Component Value Date     2018    K 3 8 2018    BUN 6 2018    CREATININE 0 48 (L) 2018    GLUCOSE 75 2018     No results found for: PTT   No results found for: INR    Blood type O    No results found for: Rambo Gerber, Leonides Klinefelter, MD, have personally seen and evaluated the patient prior to anesthetic care  I have reviewed the pre-anesthetic record, and other medical records if appropriate to the anesthetic care  If a CRNA is involved in the case, I have reviewed the CRNA assessment, if present, and agree   Risks/benefits and alternatives discussed with patient including possible PONV, sore throat, and possibility of rare anesthetic and surgical emergencies

## 2018-04-19 NOTE — ANESTHESIA PROCEDURE NOTES
CSE Block    Patient location during procedure: OB  Start time: 4/19/2018 7:21 PM  Reason for block: procedure for pain and at surgeon's request  Staffing  Anesthesiologist: Lori Sanabria  Performed: anesthesiologist   Preanesthetic Checklist  Completed: patient identified, site marked, surgical consent, pre-op evaluation, timeout performed, IV checked, risks and benefits discussed and monitors and equipment checked  CSE  Patient position: sitting  Prep: ChloraPrep  Patient monitoring: cardiac monitor and continuous pulse ox  Approach: midline  Spinal Needle  Needle type: pencil-tip   Needle gauge: 27 G  Needle length: 10 cm  Epidural Needle  Injection technique: DANIELA saline  Needle type: Tuohy   Needle gauge: 18 G  Location: lumbar (1-5)  Catheter  Catheter type: side hole  Catheter size: 20 G  Catheter at skin depth: 9 cm  Test dose: negative  AssessmentInjection Assessment:  negative aspiration for heme, no paresthesia on injection, positive aspiration for clear CSF and no pain on injection

## 2018-04-19 NOTE — OB LABOR/OXYTOCIN SAFETY PROGRESS
Oxytocin Safety Progress Check Note - Marixa Reyes 20 y o  female MRN: [id number]    Unit/Bed#: -01 Encounter: [id number]    Obstetric History       T0      L0     SAB0   TAB1   Ectopic0   Multiple0   Live Births0      Gestational Age: 37w4d  Dose (chelsea-units/min) Oxytocin: 0 chelsea-units/min (on hold until pt gets epidural)  Contraction Frequency (minutes): 2  Contraction Quality: Moderate  Tachysystole: No   Dilation: 3        Effacement (%): 70  Station: -2  Baseline Rate: 135 bpm  Fetal Heart Rate: 140 BPM  FHR Category: Category II     Oxytocin Safety Progress Check: Safety check completed    Notes/comments:     Patient uncomfortable, desires epidural  Will reassess once comfortable   Intermittent shallow variables            Kinjal Farrell MD 2018 7:10 PM

## 2018-04-19 NOTE — NURSING NOTE
Dr Lockwood notified of elevated blood pressure of 164/95 taken during a contraction with repeat bp 140/80  As well as patient increased discomfort with contractions  Will visit to evaluate

## 2018-04-19 NOTE — OB LABOR/OXYTOCIN SAFETY PROGRESS
Oxytocin Safety Progress Check Note - Marixa Reyes 20 y o  female MRN: [id number]    Unit/Bed#: -01 Encounter: [id number]    Obstetric History       T0      L0     SAB0   TAB1   Ectopic0   Multiple0   Live Births0      Gestational Age: 37w4d  Dose (chelsea-units/min) Oxytocin: 10 chelsea-units/min  Contraction Frequency (minutes): 2-3  Contraction Quality: Mild  Tachysystole: No   Dilation:  (deferred)        Effacement (%):  (deferred)  Station:  (deferred)  Baseline Rate: 130 bpm  Fetal Heart Rate: 149 BPM  FHR Category: Category I     Oxytocin Safety Progress Check: Safety check completed    Notes/comments:   Pitocin started at 1418   Will defer SVE at this time          Lanita Sandifer, MD 2018 5:34 PM

## 2018-04-20 PROBLEM — Z3A.37 37 WEEKS GESTATION OF PREGNANCY: Status: RESOLVED | Noted: 2018-04-18 | Resolved: 2018-04-20

## 2018-04-20 LAB
BASE EXCESS BLDCOA CALC-SCNC: -4.2 MMOL/L (ref 3–11)
BASE EXCESS BLDCOV CALC-SCNC: -3 MMOL/L (ref 1–9)
HCO3 BLDCOA-SCNC: 24.8 MMOL/L (ref 17.3–27.3)
HCO3 BLDCOV-SCNC: 23.1 MMOL/L (ref 12.2–28.6)
O2 CT VFR BLDCOA CALC: 7.7 ML/DL
OXYHGB MFR BLDCOA: 32.9 %
OXYHGB MFR BLDCOV: 72.6 %
PCO2 BLDCOA: 61.5 MM[HG] (ref 30–60)
PCO2 BLDCOV: 44.8 MM HG (ref 27–43)
PH BLDCOA: 7.22 [PH] (ref 7.23–7.43)
PH BLDCOV: 7.33 [PH] (ref 7.19–7.49)
PO2 BLDCOA: 18.8 MM HG (ref 5–25)
PO2 BLDCOV: 31.9 MM HG (ref 15–45)
SAO2 % BLDCOV: 17.3 ML/DL

## 2018-04-20 PROCEDURE — 88307 TISSUE EXAM BY PATHOLOGIST: CPT | Performed by: PATHOLOGY

## 2018-04-20 PROCEDURE — 82805 BLOOD GASES W/O2 SATURATION: CPT | Performed by: OBSTETRICS & GYNECOLOGY

## 2018-04-20 PROCEDURE — 59400 OBSTETRICAL CARE: CPT | Performed by: OBSTETRICS & GYNECOLOGY

## 2018-04-20 PROCEDURE — 0HQ9XZZ REPAIR PERINEUM SKIN, EXTERNAL APPROACH: ICD-10-PCS | Performed by: OBSTETRICS & GYNECOLOGY

## 2018-04-20 RX ORDER — DOCUSATE SODIUM 100 MG/1
100 CAPSULE, LIQUID FILLED ORAL 2 TIMES DAILY
Status: DISCONTINUED | OUTPATIENT
Start: 2018-04-20 | End: 2018-04-22 | Stop reason: HOSPADM

## 2018-04-20 RX ORDER — ONDANSETRON 2 MG/ML
4 INJECTION INTRAMUSCULAR; INTRAVENOUS EVERY 8 HOURS PRN
Status: DISCONTINUED | OUTPATIENT
Start: 2018-04-20 | End: 2018-04-22 | Stop reason: HOSPADM

## 2018-04-20 RX ORDER — OXYCODONE HYDROCHLORIDE AND ACETAMINOPHEN 5; 325 MG/1; MG/1
1 TABLET ORAL EVERY 4 HOURS PRN
Status: DISCONTINUED | OUTPATIENT
Start: 2018-04-20 | End: 2018-04-22 | Stop reason: HOSPADM

## 2018-04-20 RX ORDER — CALCIUM CARBONATE 200(500)MG
1000 TABLET,CHEWABLE ORAL DAILY PRN
Status: DISCONTINUED | OUTPATIENT
Start: 2018-04-20 | End: 2018-04-22 | Stop reason: HOSPADM

## 2018-04-20 RX ORDER — OXYTOCIN/RINGER'S LACTATE 30/500 ML
250 PLASTIC BAG, INJECTION (ML) INTRAVENOUS
Status: DISCONTINUED | OUTPATIENT
Start: 2018-04-20 | End: 2018-04-22 | Stop reason: HOSPADM

## 2018-04-20 RX ORDER — DIAPER,BRIEF,INFANT-TODD,DISP
1 EACH MISCELLANEOUS AS NEEDED
Status: DISCONTINUED | OUTPATIENT
Start: 2018-04-20 | End: 2018-04-22 | Stop reason: HOSPADM

## 2018-04-20 RX ORDER — IBUPROFEN 600 MG/1
600 TABLET ORAL EVERY 6 HOURS PRN
Status: DISCONTINUED | OUTPATIENT
Start: 2018-04-20 | End: 2018-04-22 | Stop reason: HOSPADM

## 2018-04-20 RX ADMIN — DOCUSATE SODIUM 100 MG: 100 CAPSULE, LIQUID FILLED ORAL at 09:35

## 2018-04-20 RX ADMIN — OXYCODONE HYDROCHLORIDE AND ACETAMINOPHEN 1 TABLET: 5; 325 TABLET ORAL at 21:05

## 2018-04-20 RX ADMIN — SODIUM CHLORIDE, SODIUM LACTATE, POTASSIUM CHLORIDE, AND CALCIUM CHLORIDE 125 ML/HR: .6; .31; .03; .02 INJECTION, SOLUTION INTRAVENOUS at 01:04

## 2018-04-20 RX ADMIN — ONDANSETRON 4 MG: 2 INJECTION INTRAMUSCULAR; INTRAVENOUS at 01:54

## 2018-04-20 RX ADMIN — IBUPROFEN 600 MG: 600 TABLET ORAL at 15:57

## 2018-04-20 RX ADMIN — SODIUM CHLORIDE, SODIUM LACTATE, POTASSIUM CHLORIDE, AND CALCIUM CHLORIDE 125 ML/HR: .6; .31; .03; .02 INJECTION, SOLUTION INTRAVENOUS at 02:55

## 2018-04-20 RX ADMIN — IBUPROFEN 600 MG: 600 TABLET ORAL at 09:35

## 2018-04-20 RX ADMIN — IBUPROFEN 600 MG: 600 TABLET ORAL at 23:38

## 2018-04-20 RX ADMIN — DOCUSATE SODIUM 100 MG: 100 CAPSULE, LIQUID FILLED ORAL at 15:57

## 2018-04-20 NOTE — LACTATION NOTE
This note was copied from a baby's chart  CONSULT - LACTATION  Baby Boy  (Alec Gooden 0 days male MRN: [id number]    [facility] Room / Bed: (N)/(N) Encounter: [id number]    Maternal Information     MOTHER:  Valeria Gooden  Maternal Age: 20 y o    OB History: #: 1, Date: 10/2014, Sex: None, Weight: None, GA: None, Delivery: Therapeutic , Apgar1: None, Apgar5: None, Living: None, Birth Comments: None    #: 2, Date: 18, Sex: Male, Weight: 2440 g (5 lb 6 1 oz), GA: 37w5d, Delivery: Vaginal, Spontaneous Delivery, Apgar1: 9, Apgar5: 9, Living: Living, Birth Comments: None   Previouse breast reduction surgery? No    Lactation history:   Has patient previously breast fed: No   How long had patient previously breast fed:     Previous breast feeding complications:       Past Surgical History:   Procedure Laterality Date    CONTRACEPTIVE CAPSULE REMOVAL      implant removed    INDUCED          Birth information:  YOB: 2019   Time of birth: 3:56 AM   Sex: male   Delivery type: Vaginal, Spontaneous Delivery   Birth Weight: 2440 g (5 lb 6 1 oz)   Percent of Weight Change: 0%     Gestational Age: 37w6d   [unfilled]    Assessment     Breast and nipple assessment: normal assessment     Assessment: normal assessment    Feeding assessment: no latch    Feeding recommendations:  breast feed on demand     Met with mother  Provided mother with Ready, Set, Baby booklet  Discussed Skin to Skin contact an benefits to mom and baby  Talked about the delay of the first bath until baby has adjusted  Spoke about the benefits of rooming in  Feeding on cue and what that means for recognizing infant's hunger  Avoidance of pacifiers for the first month discussed  Talked about exclusive breastfeeding for the first 6 months  Positioning and latch reviewed as well as showing images of other feeding positions    Discussed the properties of a good latch in any position  Reviewed hand/manual expression  Discussed s/s that baby is getting enough milk and some s/s that breastfeeding dyad may need further help  Gave information on common concerns, what to expect the first few weeks after delivery, preparing for other caregivers, and how partners can help  Resources for support also provided  Information on hand expression given  Discussed benefits of knowing how to manually express breast including stimulating milk supply, softening nipple for latch and evacuating breast in the event of engorgement  Discussed 2nd night syndrome and ways to calm infant  Hand out given      Encouraged to call for BF help when infant cues and is able to go to the breast      Marilou Coleman RN 4/20/2018 2:24 PM

## 2018-04-20 NOTE — L&D DELIVERY NOTE
Delivery Summary - OB/GYN   Uriah Escobedo 20 y o  female MRN: [id number]  Unit/Bed#: -01 Encounter: [id number]    Pre-delivery Diagnosis:   1  37w5d pregnancy  2  IUGR  3  GBS negative    Post-delivery Diagnosis: same, delivered    Attending: Zeb Rascon MD    Assistant(s): Ksenia Mueller MD    Procedure:     Anesthesia: epidural    Estimated Blood Loss:  300 mL    Specimens:   1  Arterial and venous cord gases  2  Cord blood  3  Segment of umbilical cord  4  Placenta to pathology     Complications:  None apparent    Findings:  1  Viable male  delivered on 18 at 0356 weighing 5lbs 6oz;  Apgar scores of 9 at one minute and 9 at five minutes  2  Spontaneous delivery of placenta with centrally inserted 3-vessel cord  3  Loose nuchal cord x 1  4  1st degree perineal laceration, repaired with 3-0Vicryl rapid  5  Arterial cord pH: 7 224, base excess -4 2; venous cord pH: 7 330, base excess -3 0       Disposition: Patient tolerated the procedure well and was recovering in labor and delivery room with family and  before being transferred to the post-partum floor  Patient was admitted for induction of labor for IUGR  An attempt was made to place a zarco balloon, but during the placement, patient was AROMed for clear fluid  Pitocin was then titrated  Patient received an epidural for anesthesia  She progressed to complete at 0132 and started pushing at 0348 after laboring down secondary to the inability to feel at all  Procedure Details     Description of procedure    After pushing for 8 minutes, on 18 at 0356 patient delivered a viable male , weighing 2440g, Apgars of 9 (1 min) and 9 (5 min)  The fetal vertex delivered spontaneously  There was a loose nuchal cord x 1 which was reduced at the perineum  The anterior shoulder delivered atraumatically with maternal expulsive forces and the assistance of downward traction   The posterior shoulder delivered with maternal expulsive forces and the assistance of upward traction  The remainder of the fetus delivered spontaneously  Upon delivery, the infant was placed on the mothers abdomen and the cord was clamped and cut  The infant was noted to cry spontaneously and was moving all extremities appropriately  There was no evidence for injury  Awaiting nurse resuscitators evaluated the  at bedside  Arterial and venous cord blood gases and cord blood was collected for analysis  These were promptly sent to the lab  In the immediate post-partum, 30 units of IV pitocin was administered and the uterus was noted to contract down well with massage and pitocin  The placenta delivered spontaneously at 0401 and was noted to have a centrally inserted 3 vessel cord  The vagina, cervix, and perineum were inspected and there was noted to be a first degree perineal laceration requiring repair  Laceration Repair  Patient was comfortable with epidural at that time  A first degree perineal laceration was identified and required repair  Laceration was repaired with 3-0 Vicryl rapid to reapproximate the laceration  Good hemostasis was confirmed at the conclusion of this procedure  At the conclusion of the delivery, all needle, sponge, and instrument counts were noted to be correct  Patient tolerated the procedure well and was allowed to recover in labor and delivery room with family and  before being transferred to the post-partum floor  Dr Gege Dickson was present and participated in all key portions of the case

## 2018-04-20 NOTE — DISCHARGE SUMMARY
Discharge Summary - OB/GYN  Yara Vidal 20 y o  female MRN: [id number]  Unit/Bed#: -01 Encounter: [id number]    Admission Date: 4/18/2018     Discharge Date: 4/22/2018    Attending: Tirso Villalobos MD    Principal Diagnosis: Pregnancy at 37w5d    Secondary Diagnosis: IUGR    Procedures: spontaneous vaginal delivery    Anesthesia: epidural    Complications: none apparent    Patient was admitted for induction of labor secondary to IUGR  An attempt was made to place a Meneses balloon however AROM occurred for clear fluid  Patient was then started on Pitocin titration  She received an epidural for anesthesia  She had an uncomplicated vaginal delivery and postpartum course  She had some elevated blood pressures intrapartum, CMP was within normal limits  BP postpartum were mildly eelvated but none requiring treatment  Patient was urinating, ambulating, tolerating p  o , passing flatus  Her pain was well controlled with oral analgesics  She was discharged home on postpartum day number 2    Condition at discharge: good     Discharge instructions/Information to patient and family:   See after visit summary for information provided to patient and family  Provisions for Follow-Up Care:  See after visit summary for information related to follow-up care and any pertinent home health orders  Disposition: See After Visit Summary for discharge disposition information  Planned Readmission: No    Discharge Medications: For a complete list of the patient's medications, please refer to her med rec

## 2018-04-20 NOTE — PROGRESS NOTES
Pitocin turned off dt recurrent lates  Dr Christiane Steve and Dr Damien Galvan made aware  Cat II huddle performed with Dr Christiane Steve, Dr Damien Galvan, [facility] Male RN, and recorder  Plan to leave pitocin off for 30 min  If FHR resolves with no further late decels, restart pitocin at 2145 at a rate of 5 (was at 10 when turned off)  Will continue to monitor

## 2018-04-20 NOTE — OB LABOR/OXYTOCIN SAFETY PROGRESS
Oxytocin Safety Progress Check Note - Lisa Mina 20 y o  female MRN: [id number]    Unit/Bed#: -01 Encounter: [id number]    Obstetric History       T0      L0     SAB0   TAB1   Ectopic0   Multiple0   Live Births0      Gestational Age: 37w4d  Dose (chelsea-units/min) Oxytocin: 0 chelsea-units/min  Contraction Frequency (minutes): 2  Contraction Quality: Moderate  Tachysystole: No   Dilation: 4        Effacement (%): 80  Station: 0  Baseline Rate: 135 bpm  Fetal Heart Rate: 140 BPM  FHR Category: Category II     Oxytocin Safety Progress Check: Safety check completed    Notes/comments:   Came to check the patient on the 2 hr kelsie  Pitocin had just been discontinued ~ 21:15 for repetitive late decelerations  I was not notified yet  RN thought I was still in the OR  Before the effect of d/cing the Pitocin could be realized the fetus responded to positional changes  I arrived at 21:25  A FHR acceleration accompanied my 21:16 exam      SAFETY HUDDLE    TRIGGER: Repetitive Late Decelerations    PARTICIPANTS: Myself, and Gerhardt Ship  Others in the OR    REVIEW OF CURRENT PLAN OF CARE AND MANAGEMENT: as above  Will restart Pitocin at 21:45 at half the dose- 5 mU      TIMELINE FOR NEXT ASSESSMENT: 30 '    ALL PARTICIPANTS IN AGREEMENT WITH PLAN OF CARE: yes    IS PERRT REQUIRED: no          Vitaliy Seth MD 2018 9:25 PM

## 2018-04-20 NOTE — OB LABOR/OXYTOCIN SAFETY PROGRESS
Labor Progress Note - Uriah Escobedo 20 y o  female MRN: [id number]    Unit/Bed#: -01 Encounter: [id number]    Obstetric History       T0      L0     SAB0   TAB1   Ectopic0   Multiple0   Live Births0      Gestational Age: 37w4d  Contraction Frequency (minutes): 2-5  Contraction Quality: Not applicable  Tachysystole: No   Dilation: 5        Effacement (%): 80  Station: -1  Baseline Rate: 130 bpm  Fetal Heart Rate: 140 BPM  FHR Category: Category II     Oxytocin Safety Progress Check: Safety check completed    Notes/comments:     Patient having late and early decelerations  SVE now 5cm  Will continue expectant management   If tracing improves will restart pitocin            Pawel Purdy MD 2018 11:37 PM

## 2018-04-20 NOTE — DISCHARGE INSTRUCTIONS
Vaginal Delivery   WHAT YOU SHOULD KNOW:   A vaginal delivery is the birth of your baby through your vagina (birth canal)  AFTER YOU LEAVE:   Medicines:  · NSAIDs  help decrease swelling and pain or fever  This medicine is available with or without a doctor's order  NSAIDs can cause stomach bleeding or kidney problems in certain people  If you take blood thinner medicine, always ask your healthcare provider if NSAIDs are safe for you  Always read the medicine label and follow directions  · Take your medicine as directed  Call your healthcare provider if you think your medicine is not helping or if you have side effects  Tell him if you are allergic to any medicine  Keep a list of the medicines, vitamins, and herbs you take  Include the amounts, and when and why you take them  Bring the list or the pill bottles to follow-up visits  Carry your medicine list with you in case of an emergency  Follow up with your primary healthcare provider:  Most women need to return 6 weeks after a vaginal delivery  Ask about how to care for your wounds or stitches  Write down your questions so you remember to ask them during your visits  Activity:  Rest as much as possible  Try to keep all activities short  You may be able to do some exercise soon after you have your baby  Talk with your primary healthcare provider before you start exercising  If you work outside the home, ask when you can return to your job  Kegel exercises:  Kegel exercises may help your vaginal and rectal muscles heal faster  You can do Kegel exercises by tightening and relaxing the muscles around your vagina  Kegel exercises help make the muscles stronger  Breast care:  When your milk comes in, your breasts may feel full and hard  Ask how to care for your breasts, even if you are not breastfeeding  Constipation:  Do not try to push the bowel movement out if it is too hard   High-fiber foods, extra liquids, and regular exercise can help you prevent constipation  Examples of high-fiber foods are fruit and bran  Prune juice and water are good liquids to drink  Regular exercise helps your digestive system work  You may also be told to take over-the-counter fiber and stool softener medicines  Take these items as directed  Hemorrhoids:  Pregnancy can cause severe hemorrhoids  You may have rectal pain because of the hemorrhoids  Ask how to prevent or treat hemorrhoids  Perineum care: Your perineum is the area between your vagina and anus  Keep the area clean and dry to help it heal and to prevent infection  Wash the area gently with soap and water when you bathe or shower  Rinse your perineum with warm water when you use the toilet  Your primary healthcare provider may suggest you use a warm sitz bath to help decrease pain  A sitz bath is a bathtub or basin filled to hip level  Stay in the sitz bath for 20 to 30 minutes, or as directed  Vaginal discharge: You will have vaginal discharge, called lochia, after your delivery  The lochia is bright red the first day or two after the birth  By the fourth day, the amount decreases, and it turns red-brown  Use a sanitary pad rather than a tampon to prevent a vaginal infection  It is normal to have lochia up to 8 weeks after your baby is born  Monthly periods: Your period may start again within 7 to 12 weeks after your baby is born  If you are breastfeeding, it may take longer for your period to start again  You can still get pregnant again even though you do not have your monthly period  Talk with your primary healthcare provider about a birth control method that will be good for you if you do not want to get pregnant  Mood changes: Many new mothers have some kind of mood changes after delivery  Some of these changes occur because of lack of sleep, hormone changes, and caring for a new baby  Some mood changes can be more serious, such as postpartum depression   Talk with your primary healthcare provider if you feel unable to care for yourself or your baby  Sexual activity:  You may need to avoid sex for 6 to 7 weeks after you have your baby  You may notice you have a decreased desire for sex, or sex may be painful  You may need to use a vaginal lubricant (gel) to help make sex more comfortable  Contact your primary healthcare provider if:   · You have heavy vaginal bleeding that fills 1 or more sanitary pads in 1 hour  · You have a fever  · Your pain does not go away, or gets worse  · The skin between your vagina and rectum is swollen, warm, or red  · You have swollen, hard, or painful breasts  · You feel very sad or depressed  · You feel more tired than usual      · You have questions or concerns about your condition or care  Seek care immediately or call 911 if:   · You have pus or yellow drainage coming from your vagina or wound  · You are urinating very little, or not at all  · Your arm or leg feels warm, tender, and painful  It may look swollen and red  · You feel lightheaded, have sudden and worsening chest pain, or trouble breathing  You may have more pain when you take deep breaths or cough, or you may cough up blood  © 2014 [address] is for End User's use only and may not be sold, redistributed or otherwise used for commercial purposes  All illustrations and images included in CareNotes® are the copyrighted property of [employer] , [employer]  or Jose Luis Ledezma  The above information is an  only  It is not intended as medical advice for individual conditions or treatments  Talk to your doctor, nurse or pharmacist before following any medical regimen to see if it is safe and effective for you

## 2018-04-20 NOTE — OB LABOR/OXYTOCIN SAFETY PROGRESS
Labor Progress Note - Omi Dryer 20 y o  female MRN: [id number]    Unit/Bed#: -01 Encounter: [id number]    Obstetric History       T0      L0     SAB0   TAB1   Ectopic0   Multiple0   Live Births0      Gestational Age: 37w5d  Dose (chelesa-units/min) Oxytocin: 6 chelsea-units/min (decel occurred, did not change rate at this time)  Contraction Frequency (minutes): 1 5-2 5  Contraction Quality: Moderate  Tachysystole: No   Dilation: 10        Effacement (%): 100  Station: 2  Baseline Rate: 130 bpm  Fetal Heart Rate: 140 BPM  FHR Category: Category II     Oxytocin Safety Progress Check: Safety check completed    Notes/comments:   Complete at 01:32  Was sleeping, no pressure nor urge to push  Small Variables but good variability            Nolan Ramos MD 2018 1:38 AM

## 2018-04-21 RX ADMIN — IBUPROFEN 600 MG: 600 TABLET ORAL at 16:35

## 2018-04-21 RX ADMIN — DOCUSATE SODIUM 100 MG: 100 CAPSULE, LIQUID FILLED ORAL at 08:50

## 2018-04-21 RX ADMIN — IBUPROFEN 600 MG: 600 TABLET ORAL at 08:50

## 2018-04-21 RX ADMIN — DOCUSATE SODIUM 100 MG: 100 CAPSULE, LIQUID FILLED ORAL at 16:32

## 2018-04-21 NOTE — PROGRESS NOTES
Progress Note - OB/GYN   Pedro Pablo Cruz 20 y o  female MRN: [id number]  Unit/Bed#: -01 Encounter: [id number]    Assessment:  Post partum Day #1 s/p , stable    Plan:  Continue routine post partum care  Encourage ambulation  Encourage breastfeeding  She has elevated bps (130-140s/70-80s) but not in SR  She has a herniated disc  Anticipate discharge tomorrow      Subjective/Objective   Chief Complaint:     Post delivery    Subjective:     Pain: yes, cramping, improved with meds  Tolerating PO: yes  Voiding: yes  Flatus: yes  BM: no  Ambulating: yes  Breastfeeding:  Yes  Chest pain: no  Shortness of breath: no  Leg pain: no  Lochia: minimal    Objective:     Vitals: /85 (BP Location: Right arm)   Pulse 77   Temp 97 6 °F (36 4 °C) (Oral)   Resp 18   Ht 5' 1" (1 549 m)   Wt 64 9 kg (143 lb)   LMP 2017   SpO2 98%   Breastfeeding?  Yes   BMI 27 02 kg/m²     No intake or output data in the 24 hours ending 18 0816    Lab Results   Component Value Date    WBC 7 44 2018    HGB 12 1 2018    HCT 35 4 2018    MCV 82 2018     2018       Current Facility-Administered Medications   Medication Dose Route Frequency    benzocaine-menthol-lanolin-aloe (DERMOPLAST) 20-0 5 % topical spray   Topical 4x Daily PRN    calcium carbonate (TUMS) chewable tablet 1,000 mg  1,000 mg Oral Daily PRN    docusate sodium (COLACE) capsule 100 mg  100 mg Oral BID    hydrocortisone 1 % cream 1 application  1 application Topical PRN    ibuprofen (MOTRIN) tablet 600 mg  600 mg Oral Q6H PRN    ondansetron (ZOFRAN) injection 4 mg  4 mg Intravenous Q8H PRN    oxyCODONE-acetaminophen (PERCOCET) 5-325 mg per tablet 1 tablet  1 tablet Oral Q4H PRN    oxytocin (PITOCIN) 30 Units in lactated ringers 500 mL infusion  250 chelsea-units/min Intravenous Titrated    witch hazel-glycerin (TUCKS) topical pad 1 pad  1 pad Topical PRN       Physical Exam:     Gen: AAOx3, NAD  CV: RRR  Lungs: CTA b/l  Abd: Soft, non-tender, non-distended, no rebound or guarding  Uterine fundus firm and non-tender, 1 cm below umbilicus  Ext: Non tender    Vanessa Acevedo, PGY-1  OB/GYN  4/21/2018  8:16 AM

## 2018-04-21 NOTE — LACTATION NOTE
This note was copied from a baby's chart  Mom states infant initially latched well but mom feels since needing to give formula via bottle nipple, infant latches for a few sucks only  Discussed need for infant to obtain a deep latch onto nipple  Assisted infant to breast in football hold  infant did latch correctly with intermittent suck with stimulation  Discussed possible use of SNS if infant ordered to be supplemented  Mom not sure if infant ordered supplementation  Will check with primary nurse  To call for additional assistance as needed

## 2018-04-21 NOTE — PLAN OF CARE
INFECTION - ADULT     Absence of fever/infection during neutropenic period Completed          DISCHARGE PLANNING     Discharge to home or other facility with appropriate resources Progressing        INFECTION - ADULT     Absence or prevention of progression during hospitalization Progressing        Knowledge Deficit     Patient/family/caregiver demonstrates understanding of disease process, treatment plan, medications, and discharge instructions Progressing        PAIN - ADULT     Verbalizes/displays adequate comfort level or baseline comfort level Progressing        POSTPARTUM     Experiences normal postpartum course Progressing     Appropriate maternal -  bonding Progressing     Establishment of infant feeding pattern Progressing     Incision(s), wounds(s) or drain site(s) healing without S/S of infection Progressing        SAFETY ADULT     Patient will remain free of falls Progressing     Maintain or return to baseline ADL function Progressing     Maintain or return mobility status to optimal level Progressing

## 2018-04-21 NOTE — LACTATION NOTE
This note was copied from a baby's chart  Infant assisted to breast in football hold with SNS in place  Good latch/suck noted  Mom set up to start pumping  To nurse infant at least every 3 hours with SNS and then pump

## 2018-04-22 VITALS
HEIGHT: 61 IN | WEIGHT: 143 LBS | SYSTOLIC BLOOD PRESSURE: 151 MMHG | TEMPERATURE: 97.7 F | OXYGEN SATURATION: 98 % | DIASTOLIC BLOOD PRESSURE: 92 MMHG | RESPIRATION RATE: 18 BRPM | BODY MASS INDEX: 27 KG/M2 | HEART RATE: 96 BPM

## 2018-04-22 RX ORDER — IBUPROFEN 600 MG/1
600 TABLET ORAL EVERY 6 HOURS PRN
Qty: 30 TABLET | Refills: 0 | Status: SHIPPED | OUTPATIENT
Start: 2018-04-22 | End: 2018-06-01

## 2018-04-22 RX ORDER — DIAPER,BRIEF,INFANT-TODD,DISP
1 EACH MISCELLANEOUS AS NEEDED
Qty: 30 G | Refills: 0 | Status: SHIPPED | OUTPATIENT
Start: 2018-04-22 | End: 2018-06-01

## 2018-04-22 RX ADMIN — IBUPROFEN 600 MG: 600 TABLET ORAL at 06:08

## 2018-04-22 RX ADMIN — DOCUSATE SODIUM 100 MG: 100 CAPSULE, LIQUID FILLED ORAL at 09:00

## 2018-04-22 NOTE — PROGRESS NOTES
Progress Note - OB/GYN   Randy Monet 20 y o  female MRN: [id number]  Unit/Bed#: -01 Encounter: [id number]    Assessment:  20 y o   s/p  Postpartum day  2  Herniated disc in lumbar area  Patient recovering well, Stable  Anticipate discharge today, 2018    Plan:  Continue routine post partum care  Pain management PRN  Encourage ambulation  Encourage breastfeeding    Subjective/Objective   Chief Complaint:    Postpartum state    Subjective:   Patient states she is having body soreness and back pain that is improved with motrin and heating pad- she just recently took motrin    Pain: yes, cramping and back pain, improved with meds  Tolerating PO: yes  Voiding: yes  Flatus: yes  BM: yes  Ambulating: yes  Breastfeeding:  yes  Chest pain: no  Shortness of breath: no  Leg pain: no  Lochia: minimal    Objective:     Vitals: Temp:  [97 6 °F (36 4 °C)-97 7 °F (36 5 °C)] 97 6 °F (36 4 °C)  HR:  [77-97] 80  Resp:  [18-20] 20  BP: (114-136)/(54-85) 114/54     Physical Exam:   General: NAD, alert, oriented  Cardio: Regular rate and rhythm, no murmur  Resp: nonlabored breathing, clear to auscultation bilaterally  Abdomen: Soft, no distension/rebound/guarding, non-tender  Fundus: Firm, non-tender, fundus: at umbilicus  G/U: minimal lochia noted on pad  Lower Extremities: Non-tender, no palpable cords    Medications:  Current Facility-Administered Medications   Medication Dose Route Frequency    benzocaine-menthol-lanolin-aloe (DERMOPLAST) 20-0 5 % topical spray   Topical 4x Daily PRN    calcium carbonate (TUMS) chewable tablet 1,000 mg  1,000 mg Oral Daily PRN    docusate sodium (COLACE) capsule 100 mg  100 mg Oral BID    hydrocortisone 1 % cream 1 application  1 application Topical PRN    ibuprofen (MOTRIN) tablet 600 mg  600 mg Oral Q6H PRN    ondansetron (ZOFRAN) injection 4 mg  4 mg Intravenous Q8H PRN    oxyCODONE-acetaminophen (PERCOCET) 5-325 mg per tablet 1 tablet  1 tablet Oral Q4H PRN    oxytocin (PITOCIN) 30 Units in lactated ringers 500 mL infusion  250 chelsea-units/min Intravenous Titrated    witch hazel-glycerin (TUCKS) topical pad 1 pad  1 pad Topical SARIAHN       Belkys Andrade  4/22/2018  7:45 AM

## 2018-04-22 NOTE — PLAN OF CARE
DISCHARGE PLANNING     Discharge to home or other facility with appropriate resources Adequate for Discharge        INFECTION - ADULT     Absence or prevention of progression during hospitalization Adequate for Discharge        Knowledge Deficit     Patient/family/caregiver demonstrates understanding of disease process, treatment plan, medications, and discharge instructions Adequate for Discharge        PAIN - ADULT     Verbalizes/displays adequate comfort level or baseline comfort level Adequate for Discharge        POSTPARTUM     Experiences normal postpartum course Adequate for Discharge     Appropriate maternal -  bonding Adequate for Discharge     Establishment of infant feeding pattern Adequate for Discharge     Incision(s), wounds(s) or drain site(s) healing without S/S of infection Adequate for Discharge        SAFETY ADULT     Patient will remain free of falls Adequate for Discharge     Maintain or return to baseline ADL function Adequate for Discharge     Maintain or return mobility status to optimal level Adequate for Discharge

## 2018-04-23 NOTE — CASE MANAGEMENT
Notification of Maternity Inpatient Admission/Maternity Inpatient Authorization Request  This is a Notification of Maternity Inpatient Admission/Maternity Inpatient Authorization Request to our facility [facility]  Please be advised that this patient is currently in our facility under Inpatient Status  Below you will find the Birth/ Summary, Attending Physician and Facilitys information including NPI# and contact for the Utilization  assigned to the [facility] where the patient is receiving services  Please feel free to contact the Utilization Review Department with any questions  Mothers Information:  Renny Beltrán  MRN: [id number]  YOB: 1995  Admission Date: 2018  1:54 PM  Discharge Date: 2018  2:25 PM  Disposition: Home/Self Care  Admitting Diagnosis:   O80 VAGINAL DELIVERY  Other specified pregnancy related conditions, unspecified trimester [O26 899]   Information:  Estimated Date of Delivery: 18  Information for the patient's : Galindo Hurtado [[id number]]      Delivery Information:  Sex: male  Delivered 2018 3:56 AM by Vaginal, Spontaneous Delivery; Gestational Age: 37w6d    Atkinson Measurements:  Weight: 5 lb 6 1 oz (2440 g); Height: 18"    APGAR 1 minute 5 minutes 10 minutes   Totals: 9 9      OB History as of 18      Para Term  AB Living    2 1 1   1 1    SAB TAB Ectopic Multiple Live Births      1     1        Attending Physician:  HOSEA Farrar    Specialty- Obstetrics and Gynecology  [facility] ID- [id number]  [address]  Phone 1: [phone]  Fax: [phone]    Heidi [address] [place] [address]  [address]  [phone]  Tax ID: [id number]  NPI: [id number]    [address] [place] [address] in the [facility] by Jose Luis Ledezma for 2017  Network Utilization Review Department  Phone: [phone]; Fax [phone]  ATTENTION: The Network Utilization Review Department is now centralized for our 7 Facilities  Make a note that we have a new phone and fax numbers for our Department  Please call with any questions or concerns to [phone] and carefully follow the prompts so that you are directed to the right person  All voicemails are confidential  Fax any determinations, approvals, denials, and requests for initial or continue stay review clinical to [phone]  Due to HIGH CALL volume, it would be easier if you could please send faxed requests to expedite your requests and in part, help us provide discharge notifications faster

## 2018-06-01 ENCOUNTER — POSTPARTUM VISIT (OUTPATIENT)
Dept: OBGYN CLINIC | Facility: MEDICAL CENTER | Age: 23
End: 2018-06-01
Payer: COMMERCIAL

## 2018-06-01 VITALS — DIASTOLIC BLOOD PRESSURE: 62 MMHG | BODY MASS INDEX: 24.03 KG/M2 | WEIGHT: 127.2 LBS | SYSTOLIC BLOOD PRESSURE: 116 MMHG

## 2018-06-01 PROCEDURE — 99213 OFFICE O/P EST LOW 20 MIN: CPT | Performed by: OBSTETRICS & GYNECOLOGY

## 2018-06-01 RX ORDER — MEDROXYPROGESTERONE ACETATE 150 MG/ML
150 INJECTION, SUSPENSION INTRAMUSCULAR
Qty: 1 ML | Refills: 2 | Status: SHIPPED | OUTPATIENT
Start: 2018-06-01 | End: 2018-08-09

## 2018-06-01 NOTE — PROGRESS NOTES
Assessment/Plan:      Diagnoses and all orders for this visit:    Postpartum care and examination  -     medroxyPROGESTERone (DEPO-PROVERA) 150 mg/mL injection; Inject 1 mL (150 mg total) into the shoulder, thigh, or buttocks every 3 (three) months        Postpartum examination was completed  Patient would like to utilize Depo-Provera until she has read the literature on the IUDs  Informational literature was provided today  Patient will hopefully have her Depo-Provera dosed today at our [place] office  Patient to return in 6 months for her annual visit  She will be scheduled sooner if she desires IUD    Subjective:     Patient ID: Ruslan Marie is a 24 y o  female  Patient returns for postpartum examination  She is status post vaginal delivery following induction for intrauterine growth restriction  Patient is currently bottle and breastfeeding is doing well  Baby is thriving  Patient is not resumed intercourse  She has not had menses  She is interested in Depo-Provera or the [place] or Mirena IUD        Review of Systems   All other systems reviewed and are negative  Objective:     Physical Exam   Constitutional: She appears well-developed and well-nourished  Neck: Neck supple  Abdominal: Soft  There is no tenderness  Genitourinary: Vagina normal and uterus normal    Skin: Skin is warm and dry  Psychiatric: She has a normal mood and affect   Her behavior is normal  Judgment and thought content normal

## 2018-06-11 ENCOUNTER — OFFICE VISIT (OUTPATIENT)
Dept: NEUROLOGY | Facility: CLINIC | Age: 23
End: 2018-06-11
Payer: COMMERCIAL

## 2018-06-11 VITALS
BODY MASS INDEX: 23.79 KG/M2 | HEART RATE: 80 BPM | HEIGHT: 61 IN | WEIGHT: 126 LBS | DIASTOLIC BLOOD PRESSURE: 80 MMHG | SYSTOLIC BLOOD PRESSURE: 118 MMHG

## 2018-06-11 DIAGNOSIS — M54.16 LUMBAR RADICULOPATHY: Primary | ICD-10-CM

## 2018-06-11 PROCEDURE — 99213 OFFICE O/P EST LOW 20 MIN: CPT | Performed by: PSYCHIATRY & NEUROLOGY
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
## 2018-08-08 ENCOUNTER — TELEPHONE (OUTPATIENT)
Dept: OBGYN CLINIC | Facility: CLINIC | Age: 23
End: 2018-08-08

## 2018-08-08 NOTE — TELEPHONE ENCOUNTER
IF she is that nauseated she needs an office visit  I am ok with taking someone out of work but need an office visit   Also is she a candidate for other medications, zofran pump, does she need IV fluids

## 2018-08-08 NOTE — TELEPHONE ENCOUNTER
Pts lmp   She just delivered her son 4 mos ago  She just found out at pcp that she is pregnant yesterday    11wks 5 d  Pt vomits up all solid food for past week  For past 2 weeks is vomiting in AM  Is able to keep down liquids later in day  Has lost 4 lbs in past week  Weighs about 120 lbs  She lives in [facility]  Before I try getting her in for an appt - Want to make sure she does not need to go to ED for dehydration  Shall I still get her an ov or ED  ?  Thank you

## 2018-08-08 NOTE — TELEPHONE ENCOUNTER
Incoming prenatal call  Pt is DEBBIE MÁRQUEZ scheduled for 8/20  Reports extreme nausea interfering with work  Works in [employer] where it is very warm  Believes she will get fired because she is constantly vomittng  Thinking she should take intermittent TORY  States she has tried Vit B6 without relief  Reports trying frequent small snacks, ice pops and Gatorade  Is able to hold down at times  Denies feeling dizzy or lightheaded  Believes she was prescribed Reglan and another medication she does not recall with last pregnancy  Will route to provider to advise further

## 2018-08-08 NOTE — TELEPHONE ENCOUNTER
Pt will come to [facility] office tomorrow in pm  Pt will drink liquids in pm and avoid solid food  Pt denies chest pain, sob  Will have someone drive her tomorrow   If sx worse, will go to ED

## 2018-08-09 ENCOUNTER — HOSPITAL ENCOUNTER (EMERGENCY)
Facility: HOSPITAL | Age: 23
Discharge: HOME | End: 2018-08-10
Attending: EMERGENCY MEDICINE
Payer: COMMERCIAL

## 2018-08-09 ENCOUNTER — OFFICE VISIT (OUTPATIENT)
Dept: OBGYN CLINIC | Facility: CLINIC | Age: 23
End: 2018-08-09
Payer: COMMERCIAL

## 2018-08-09 VITALS — BODY MASS INDEX: 21.54 KG/M2 | WEIGHT: 114 LBS | DIASTOLIC BLOOD PRESSURE: 60 MMHG | SYSTOLIC BLOOD PRESSURE: 110 MMHG

## 2018-08-09 VITALS
SYSTOLIC BLOOD PRESSURE: 125 MMHG | WEIGHT: 114 LBS | RESPIRATION RATE: 16 BRPM | DIASTOLIC BLOOD PRESSURE: 72 MMHG | HEIGHT: 61 IN | HEART RATE: 69 BPM | OXYGEN SATURATION: 99 % | BODY MASS INDEX: 21.52 KG/M2 | TEMPERATURE: 98.2 F

## 2018-08-09 DIAGNOSIS — O21.9 NAUSEA AND VOMITING OF PREGNANCY, ANTEPARTUM: Primary | ICD-10-CM

## 2018-08-09 DIAGNOSIS — O21.0 HYPEREMESIS ARISING DURING PREGNANCY: Primary | ICD-10-CM

## 2018-08-09 PROBLEM — Z34.83 ENCOUNTER FOR SUPERVISION OF OTHER NORMAL PREGNANCY, THIRD TRIMESTER: Status: RESOLVED | Noted: 2018-03-14 | Resolved: 2018-08-09

## 2018-08-09 PROBLEM — O36.5930 POOR FETAL GROWTH AFFECTING MANAGEMENT OF MOTHER IN THIRD TRIMESTER: Status: RESOLVED | Noted: 2018-04-04 | Resolved: 2018-08-09

## 2018-08-09 LAB
ANION GAP SERPL CALCULATED.3IONS-SCNC: 9 MMOL/L (ref 4–13)
BASOPHILS # BLD AUTO: 0.04 THOUSANDS/ΜL (ref 0–0.1)
BASOPHILS NFR BLD AUTO: 1 % (ref 0–1)
BUN SERPL-MCNC: 5 MG/DL (ref 5–25)
CALCIUM SERPL-MCNC: 9 MG/DL (ref 8.3–10.1)
CHLORIDE SERPL-SCNC: 101 MMOL/L (ref 100–108)
CO2 SERPL-SCNC: 26 MMOL/L (ref 21–32)
CREAT SERPL-MCNC: 0.53 MG/DL (ref 0.6–1.3)
EOSINOPHIL # BLD AUTO: 0.02 THOUSAND/ΜL (ref 0–0.61)
EOSINOPHIL NFR BLD AUTO: 0 % (ref 0–6)
ERYTHROCYTE [DISTWIDTH] IN BLOOD BY AUTOMATED COUNT: 12.5 % (ref 11.6–15.1)
EXT PREG TEST URINE: NORMAL
GFR SERPL CREATININE-BSD FRML MDRD: 133 ML/MIN/1.73SQ M
GLUCOSE SERPL-MCNC: 81 MG/DL (ref 65–140)
HCT VFR BLD AUTO: 39.1 % (ref 34.8–46.1)
HGB BLD-MCNC: 13.1 G/DL (ref 11.5–15.4)
IMM GRANULOCYTES # BLD AUTO: 0.02 THOUSAND/UL (ref 0–0.2)
IMM GRANULOCYTES NFR BLD AUTO: 0 % (ref 0–2)
LYMPHOCYTES # BLD AUTO: 2.11 THOUSANDS/ΜL (ref 0.6–4.47)
LYMPHOCYTES NFR BLD AUTO: 27 % (ref 14–44)
MCH RBC QN AUTO: 27.4 PG (ref 26.8–34.3)
MCHC RBC AUTO-ENTMCNC: 33.5 G/DL (ref 31.4–37.4)
MCV RBC AUTO: 82 FL (ref 82–98)
MONOCYTES # BLD AUTO: 0.77 THOUSAND/ΜL (ref 0.17–1.22)
MONOCYTES NFR BLD AUTO: 10 % (ref 4–12)
NEUTROPHILS # BLD AUTO: 4.85 THOUSANDS/ΜL (ref 1.85–7.62)
NEUTS SEG NFR BLD AUTO: 62 % (ref 43–75)
NRBC BLD AUTO-RTO: 0 /100 WBCS
PLATELET # BLD AUTO: 294 THOUSANDS/UL (ref 149–390)
PMV BLD AUTO: 9.7 FL (ref 8.9–12.7)
POTASSIUM SERPL-SCNC: 3.4 MMOL/L (ref 3.5–5.3)
RBC # BLD AUTO: 4.78 MILLION/UL (ref 3.81–5.12)
SODIUM SERPL-SCNC: 136 MMOL/L (ref 136–145)
WBC # BLD AUTO: 7.81 THOUSAND/UL (ref 4.31–10.16)

## 2018-08-09 PROCEDURE — 81001 URINALYSIS AUTO W/SCOPE: CPT | Performed by: EMERGENCY MEDICINE

## 2018-08-09 PROCEDURE — 85025 COMPLETE CBC W/AUTO DIFF WBC: CPT | Performed by: EMERGENCY MEDICINE

## 2018-08-09 PROCEDURE — 81025 URINE PREGNANCY TEST: CPT | Performed by: EMERGENCY MEDICINE

## 2018-08-09 PROCEDURE — 36415 COLL VENOUS BLD VENIPUNCTURE: CPT | Performed by: EMERGENCY MEDICINE

## 2018-08-09 PROCEDURE — 96361 HYDRATE IV INFUSION ADD-ON: CPT

## 2018-08-09 PROCEDURE — 96366 THER/PROPH/DIAG IV INF ADDON: CPT

## 2018-08-09 PROCEDURE — 80048 BASIC METABOLIC PNL TOTAL CA: CPT | Performed by: EMERGENCY MEDICINE

## 2018-08-09 PROCEDURE — 96365 THER/PROPH/DIAG IV INF INIT: CPT

## 2018-08-09 PROCEDURE — 99214 OFFICE O/P EST MOD 30 MIN: CPT | Performed by: OBSTETRICS & GYNECOLOGY

## 2018-08-09 RX ORDER — CALCIUM CARBONATE 500 MG/1
1 TABLET ORAL DAILY
COMMUNITY
End: 2018-08-20

## 2018-08-09 RX ORDER — PROMETHAZINE HYDROCHLORIDE 25 MG/1
25 TABLET ORAL EVERY 6 HOURS PRN
Qty: 30 TABLET | Refills: 0 | Status: SHIPPED | OUTPATIENT
Start: 2018-08-09 | End: 2018-08-20

## 2018-08-09 RX ADMIN — DEXTROSE AND SODIUM CHLORIDE 500 ML: 5; .9 INJECTION, SOLUTION INTRAVENOUS at 21:55

## 2018-08-09 RX ADMIN — SODIUM CHLORIDE 1000 ML: 0.9 INJECTION, SOLUTION INTRAVENOUS at 20:48

## 2018-08-09 NOTE — PROGRESS NOTES
Assessment/Plan:      Diagnoses and all orders for this visit:    Nausea and vomiting of pregnancy, antepartum  -     promethazine (PHENERGAN) 25 mg tablet; Take 1 tablet (25 mg total) by mouth every 6 (six) hours as needed for nausea or vomiting    Other orders  -     TUMS 500 MG chewable tablet; Chew 1 tablet daily      Information given regarding OTC medications for nausea/vomitting of pregnancy, lifestyle modifications  Note given to take patient out of work for one week while we begin vitamin B6 and doxylamine  Rx phenergan sent to pharmacy  I have advised the patient to go to the ER for IVF given her ketones - she has her 2 month old child with her so will be unable to go to the ER here  She will go home to leave her son with her  and then go to the [facility] ER  Aware that they cannot admit her but I have low suspicious that she will need admission at this time  Counseled regarding time course for nausea/vomiting in pregnancy, medication options  All questions answered  Total visit time was 25 minutes, of which >50% was spent in counseling and coordination of care regarding the above problem  Subjective:     Patient ID: Renata Cruz is a 24 y o  female  Patient presents to the office today for nausea/vomiting secondary to pregnancy  She has a history of nausea/vomiting with her first pregnancy though she did better than she is doing now she believes secondary to being out of work for back problems at the time  She is currently working long hours in a factory and feels the head is making it hard to stay hydrated  She has a history of a  on  so also is caring for a   Her menses were in May which would make her approximately 11 weeks pregnant  She has not yet established care with our office for this pregnancy  She reports using vitamin B12 and ginger at home with no relief  She also uses TUMS which does not help    She tolerates some liquids but no solid food   She vomits three times per day  She today feels dizzy and nauseated  Vomiting    Associated symptoms include dizziness  Dizziness   Associated symptoms include fatigue, nausea and vomiting  Review of Systems   Constitutional: Positive for appetite change and fatigue  Gastrointestinal: Positive for nausea and vomiting  Neurological: Positive for dizziness           Objective:  Urine dip positive for large ketones   Physical Exam

## 2018-08-10 LAB
BACTERIA UR QL AUTO: ABNORMAL /HPF
BILIRUB UR QL STRIP: NEGATIVE
CLARITY UR: ABNORMAL
COLOR UR: YELLOW
GLUCOSE UR STRIP-MCNC: ABNORMAL MG/DL
HGB UR QL STRIP.AUTO: NEGATIVE
KETONES UR STRIP-MCNC: ABNORMAL MG/DL
LEUKOCYTE ESTERASE UR QL STRIP: ABNORMAL
NITRITE UR QL STRIP: NEGATIVE
NON-SQ EPI CELLS URNS QL MICRO: ABNORMAL /HPF
PH UR STRIP.AUTO: 6.5 [PH] (ref 4.5–8)
PROT UR STRIP-MCNC: NEGATIVE MG/DL
RBC #/AREA URNS AUTO: ABNORMAL /HPF
SP GR UR STRIP.AUTO: 1.01 (ref 1–1.03)
UROBILINOGEN UR QL STRIP.AUTO: 0.2 E.U./DL
WBC #/AREA URNS AUTO: ABNORMAL /HPF

## 2018-08-10 PROCEDURE — 99284 EMERGENCY DEPT VISIT MOD MDM: CPT
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
## 2018-08-10 NOTE — DISCHARGE INSTRUCTIONS
Hyperemesis Gravidarum   WHAT YOU NEED TO KNOW:   Hyperemesis gravidarum is a severe form of nausea and vomiting that happens during pregnancy  Hyperemesis is more severe than morning sickness  It may cause you to have nausea or vomiting all day for many days  It may also keep you from getting enough food and liquid  DISCHARGE INSTRUCTIONS:   Return to the emergency department if:   · You have signs of severe dehydration including little to no urine and dry mouth or lips  · You have severe stomach pain  · You feel too weak or dizzy to stand up  · You see blood in your vomit or bowel movements  Contact your healthcare provider if:   · You cannot keep any food or liquid down  · You are losing weight  · You have a fever  · You have questions or concerns about your condition or care  Medicines:   · Medicines, vitamins, or supplements  may be given to help decrease nausea and vomiting  · Take your medicine as directed  Contact your healthcare provider if you think your medicine is not helping or if you have side effects  Tell him of her if you are allergic to any medicine  Keep a list of the medicines, vitamins, and herbs you take  Include the amounts, and when and why you take them  Bring the list or the pill bottles to follow-up visits  Carry your medicine list with you in case of an emergency  Manage your symptoms:   · Eat small amounts of food every 1 to 2 hours  Some examples of good foods to eat include broth, toast, crackers, fruit, eggs, gelatin, or cottage cheese  Do not eat spicy or high-fat foods  Foods and drinks with ginger, such as ginger ale, may help to decrease nausea and vomiting  · Drink liquids as directed  You may need to drink small amounts of liquid often to prevent dehydration  Ask how much liquid to drink each day and which liquids are best for you  · Rest when you need to    Start activity slowly and work up to your usual routine as you start to feel better  · Medicines, vitamins, or supplements  may be given to help decrease nausea and vomiting  · Weigh yourself daily if directed by your healthcare provider  You may need to keep a record of your daily weights for your healthcare provider  He may want to make sure you are not losing too much weight  Follow up with your healthcare provider as directed:  Write down your questions so you remember to ask them during your visits  © 2017 [facility] Information is for End User's use only and may not be sold, redistributed or otherwise used for commercial purposes  All illustrations and images included in CareNotes® are the copyrighted property of A D A M , Inc  or Jose Luis Ledezma  The above information is an  only  It is not intended as medical advice for individual conditions or treatments  Talk to your doctor, nurse or pharmacist before following any medical regimen to see if it is safe and effective for you

## 2018-08-14 ENCOUNTER — OFFICE VISIT (OUTPATIENT)
Dept: OBGYN CLINIC | Facility: CLINIC | Age: 23
End: 2018-08-14
Payer: COMMERCIAL

## 2018-08-14 VITALS — WEIGHT: 111 LBS | SYSTOLIC BLOOD PRESSURE: 118 MMHG | DIASTOLIC BLOOD PRESSURE: 64 MMHG | BODY MASS INDEX: 20.97 KG/M2

## 2018-08-14 DIAGNOSIS — O21.9 VOMITING OR NAUSEA OF PREGNANCY: Primary | ICD-10-CM

## 2018-08-14 PROCEDURE — 99212 OFFICE O/P EST SF 10 MIN: CPT | Performed by: OBSTETRICS & GYNECOLOGY

## 2018-08-14 NOTE — PROGRESS NOTES
Assessment/Plan:      Diagnoses and all orders for this visit:    Vomiting or nausea of pregnancy    Other orders  -     Discontinue: Cyanocobalamin (VITAMIN B12 PO); Take by mouth  -     Pyridoxine HCl (VITAMIN B-6 PO); Take by mouth  -     Doxylamine Succinate, Sleep, (UNISOM PO); Take by mouth      Start phenergan now  If no improvement by 8/17, patient to call back for additional Rx  If patient unable to tolerate phenergan by mouth, can change to rectal suppository  Return on 8/20 as scheduled for ultrasound and intake visit  Subjective:     Patient ID: Yraa Vidal is a 24 y o  female  Patient returns today for a follow up appointment secondary to nausea/vomiting of pregnancy  She was seen last week and was sent to the ER for IVF  She did  the vitamin B6 and unisom but states it did not help her a lot  She feels like she is vomiting less but is still vomiting  She spent the week at home and did not  the phenergan until today  She has not used it yet  She is aware that she should continue the vitamin B6 and unisom in addition to the phenergan and that less vomiting is the goal   Advised her that she may not feel entirely better until later in the pregnancy  She states she spoke with her job and she is not a candidate for FMLA as she just came back from a maternity leave  She states they placed her on leave of absence as of 8/5  I explained that I can only document starting on 8/9 when we saw her in the office  I further informed her that we will need to re-evaluate on a 1-2 week basis to determine when she can return to work (which may be sooner than all of her nausea has resolved if she is tolerating PO and not vomiting)  She states her work would need to keep her out long term in order for her leave of absence to count but nothing on the paperwork that she brings to the office indicates this    I advised her that we do not take people out long term for conditions that can improve quickly as the pregnancy progresses  She states understanding but seems frustrated given that her job is "harder" than many people's jobs  I expressed understanding but that we cannot differentiate and give longer leaves to people with harder jobs for the same condition  Nausea   Associated symptoms include nausea  Review of Systems   Gastrointestinal: Positive for nausea           Objective:     Physical Exam

## 2018-08-17 ENCOUNTER — TELEPHONE (OUTPATIENT)
Dept: OBGYN CLINIC | Facility: CLINIC | Age: 23
End: 2018-08-17

## 2018-08-17 NOTE — TELEPHONE ENCOUNTER
Patient has started promethazine for nausea and vomiting  She has been getting headaches which she thinks are from that  She wanted to know if she could take tylenol  I told her to try tylenol and if that does not help to let us know  The promethazine is helping her nausea and vomiting

## 2018-08-20 ENCOUNTER — TELEPHONE (OUTPATIENT)
Dept: OBGYN CLINIC | Facility: CLINIC | Age: 23
End: 2018-08-20

## 2018-08-20 ENCOUNTER — OFFICE VISIT (OUTPATIENT)
Dept: OBGYN CLINIC | Facility: MEDICAL CENTER | Age: 23
End: 2018-08-20
Payer: COMMERCIAL

## 2018-08-20 VITALS — BODY MASS INDEX: 21.35 KG/M2 | WEIGHT: 113 LBS | DIASTOLIC BLOOD PRESSURE: 60 MMHG | SYSTOLIC BLOOD PRESSURE: 104 MMHG

## 2018-08-20 DIAGNOSIS — N91.2 AMENORRHEA: Primary | ICD-10-CM

## 2018-08-20 PROCEDURE — 99213 OFFICE O/P EST LOW 20 MIN: CPT | Performed by: PHYSICIAN ASSISTANT

## 2018-08-20 PROCEDURE — 76801 OB US < 14 WKS SINGLE FETUS: CPT | Performed by: PHYSICIAN ASSISTANT

## 2018-08-20 NOTE — TELEPHONE ENCOUNTER
Pt lm on with answering service stating that she is having headaches and dizziness from prescription she just started

## 2018-08-20 NOTE — ASSESSMENT & PLAN NOTE
(+) viable IUP, size < dates, consistent with irregular ovulation related to postpartum  GAVIOTA by todays US 3/17/19  Questions answered and pt congratulated  RTO for interview and the PN1

## 2018-08-20 NOTE — PROGRESS NOTES
Early OB Ultrasound Procedure Note  Carissa Santiago  YOB: 1995      Referring Physician: No ref  provider found  Technician: Study performed by the interpreting physician assistant    Indications:  amenorrhea   /60 (BP Location: Left arm)   Wt 51 3 kg (113 lb)   LMP 2018 (Approximate)   BMI 21 35 kg/m²     Patient's last menstrual period was 2018 (approximate)  , , with EGA of  13 weeks and 1   days    Patient denies vaginal bleeding or brown discharge    , son 1 months old, only had one menses after son  Also having nausea and vomiting, controlled on phenergan but gave her headache so she stopped it  She has been on no meds x 3 days and only vomiting once per day  Procedure Details  A gestational sac is seen containing a yolk sac and a gomez embryo  The embryonic crown-rump length measures 3 27 cm  and calculates to an estimated gestational age of 9 weeks and 1   days     Embryonic cardiac activity is  present @ 164 bpm  Description of fetal anatomy Normal  Description of ovaries: normal  Description of uterus: normal    Findings:  Viable, gomez intrauterine pregnancy at 10 weeks,  1 days, with a calculated EDC of 2019  Continue monitoring vomiting, if worsens call office for zofran rx

## 2018-08-31 ENCOUNTER — TELEPHONE (OUTPATIENT)
Dept: OBGYN CLINIC | Facility: CLINIC | Age: 23
End: 2018-08-31

## 2018-08-31 NOTE — TELEPHONE ENCOUNTER
I would advise continue to keep down what she can  We sometimes see weight loss in the first trimester  If her vomiting is improving it typically will continue to improve

## 2018-08-31 NOTE — TELEPHONE ENCOUNTER
Patient called, she is 11 weeks  She is concerned because she says she is losing weight  She says at her last appt she was 111, at home today she is 108  She was prescribed promethazine and it gave her bad headaches so she stopped  She is now using unison BID  She is not vomiting as much as she was, usually once or twice a day  Yesterday and today she has a bad headache, just vomited  She says she is eating soup, crackers, drinking water and powerade  She denies any dizziness, lightheadedness or palpitations, she is urinating, light yellow in color  She feels really bad today but overall she says the vomiting is better  She is mainly concerned about the weight loss   Please advise

## 2018-08-31 NOTE — TELEPHONE ENCOUNTER
I spoke with patient, she will call with any signs of dehydration and continue with small frequent meals  I told her to call with any concerns

## 2018-09-11 ENCOUNTER — OFFICE VISIT (OUTPATIENT)
Dept: OBGYN CLINIC | Facility: MEDICAL CENTER | Age: 23
End: 2018-09-11

## 2018-09-11 ENCOUNTER — OFFICE VISIT (OUTPATIENT)
Dept: NEUROLOGY | Facility: CLINIC | Age: 23
End: 2018-09-11
Payer: COMMERCIAL

## 2018-09-11 VITALS — SYSTOLIC BLOOD PRESSURE: 110 MMHG | WEIGHT: 110.6 LBS | DIASTOLIC BLOOD PRESSURE: 70 MMHG | BODY MASS INDEX: 20.9 KG/M2

## 2018-09-11 VITALS
WEIGHT: 109 LBS | HEIGHT: 64 IN | BODY MASS INDEX: 18.61 KG/M2 | SYSTOLIC BLOOD PRESSURE: 130 MMHG | DIASTOLIC BLOOD PRESSURE: 62 MMHG | HEART RATE: 71 BPM

## 2018-09-11 DIAGNOSIS — O21.9 VOMITING OR NAUSEA OF PREGNANCY: Primary | ICD-10-CM

## 2018-09-11 DIAGNOSIS — M54.50 MIDLINE LOW BACK PAIN WITHOUT SCIATICA, UNSPECIFIED CHRONICITY: Primary | ICD-10-CM

## 2018-09-11 PROCEDURE — 99213 OFFICE O/P EST LOW 20 MIN: CPT | Performed by: PSYCHIATRY & NEUROLOGY

## 2018-09-11 PROCEDURE — PNV: Performed by: PHYSICIAN ASSISTANT
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
## 2018-09-11 NOTE — PROGRESS NOTES
Assessment/Plan:  Problem List Items Addressed This Visit     Vomiting or nausea of pregnancy - Primary     Symptoms improving and patient tolerating PO, recommend zantac or tums for stomach pains with eating, could be from GERD  Return to work letter done  RTO for interview and PN1             Subjective:      Patient ID: Kenny Helm is a 24 y o  female  Presents to the office for recheck of nausea and vomiting  Patient states vomiting improving, only vomiting once or less per day  Does have stomach pain when eats bland or non-bland food  Otherwise no OB complaints  Needs letter to return to work  The following portions of the patient's history were reviewed and updated as appropriate: allergies, current medications, past family history, past medical history, past social history, past surgical history and problem list     Review of Systems   Constitutional: Positive for fatigue  Negative for chills and fever  Gastrointestinal: Positive for nausea and vomiting  Negative for abdominal pain, constipation and diarrhea  Genitourinary: Negative for vaginal bleeding  Objective:  /70 (BP Location: Right arm)   Wt 50 2 kg (110 lb 9 6 oz)   LMP 05/20/2018 (Approximate)   BMI 20 90 kg/m²      Physical Exam   Constitutional: She is oriented to person, place, and time  She appears well-developed and well-nourished  No distress  Neurological: She is alert and oriented to person, place, and time  Psychiatric: She has a normal mood and affect  Nursing note and vitals reviewed

## 2018-09-25 ENCOUNTER — INITIAL PRENATAL (OUTPATIENT)
Dept: OBGYN CLINIC | Facility: MEDICAL CENTER | Age: 23
End: 2018-09-25

## 2018-09-25 VITALS
WEIGHT: 111.6 LBS | DIASTOLIC BLOOD PRESSURE: 68 MMHG | SYSTOLIC BLOOD PRESSURE: 104 MMHG | BODY MASS INDEX: 21.07 KG/M2 | HEIGHT: 61 IN | RESPIRATION RATE: 14 BRPM

## 2018-09-25 DIAGNOSIS — Z34.82 ENCOUNTER FOR SUPERVISION OF OTHER NORMAL PREGNANCY IN SECOND TRIMESTER: ICD-10-CM

## 2018-09-25 DIAGNOSIS — Z34.92 SECOND TRIMESTER PREGNANCY: Primary | ICD-10-CM

## 2018-09-25 PROCEDURE — OBC: Performed by: OBSTETRICS & GYNECOLOGY

## 2018-09-25 NOTE — PROGRESS NOTES
Patient came to the OB Intake with her 11 month old son  Short pregnancy interval she just delivered five months ago vaginally a referral was given to the [facility]   Patient reports that she is still having nausea and has to eat every two hours but she reports that she is doing well  Patient reports that she is not taking prenatal vitamins because they make her vomit patient works at [employer]   Patient reports that she does not have any traveling plans during her pregnancy   Patient is planning to do her prenatal blood work before her PN1 visit that is schedule for next Monday at the [facility]

## 2018-10-01 ENCOUNTER — INITIAL PRENATAL (OUTPATIENT)
Dept: OBGYN CLINIC | Facility: MEDICAL CENTER | Age: 23
End: 2018-10-01
Payer: COMMERCIAL

## 2018-10-01 VITALS — WEIGHT: 111 LBS | DIASTOLIC BLOOD PRESSURE: 50 MMHG | BODY MASS INDEX: 20.97 KG/M2 | SYSTOLIC BLOOD PRESSURE: 120 MMHG

## 2018-10-01 DIAGNOSIS — Z34.82 PRENATAL CARE, SUBSEQUENT PREGNANCY, SECOND TRIMESTER: Primary | ICD-10-CM

## 2018-10-01 DIAGNOSIS — O21.9 VOMITING OR NAUSEA OF PREGNANCY: ICD-10-CM

## 2018-10-01 DIAGNOSIS — Z23 NEED FOR INFLUENZA VACCINATION: ICD-10-CM

## 2018-10-01 DIAGNOSIS — R87.612 LGSIL ON PAP SMEAR OF CERVIX: ICD-10-CM

## 2018-10-01 DIAGNOSIS — Z34.92 PRENATAL CARE IN SECOND TRIMESTER: ICD-10-CM

## 2018-10-01 PROCEDURE — 90471 IMMUNIZATION ADMIN: CPT | Performed by: OBSTETRICS & GYNECOLOGY

## 2018-10-01 PROCEDURE — 90686 IIV4 VACC NO PRSV 0.5 ML IM: CPT | Performed by: OBSTETRICS & GYNECOLOGY

## 2018-10-01 PROCEDURE — 87591 N.GONORRHOEAE DNA AMP PROB: CPT | Performed by: OBSTETRICS & GYNECOLOGY

## 2018-10-01 PROCEDURE — 87624 HPV HI-RISK TYP POOLED RSLT: CPT | Performed by: OBSTETRICS & GYNECOLOGY

## 2018-10-01 PROCEDURE — PNV: Performed by: OBSTETRICS & GYNECOLOGY

## 2018-10-01 PROCEDURE — 87491 CHLMYD TRACH DNA AMP PROBE: CPT | Performed by: OBSTETRICS & GYNECOLOGY

## 2018-10-01 PROCEDURE — G0145 SCR C/V CYTO,THINLAYER,RESCR: HCPCS | Performed by: OBSTETRICS & GYNECOLOGY

## 2018-10-01 RX ORDER — D-METHORPHAN/PE/ACETAMINOPHEN 10-5-325MG
CAPSULE ORAL
COMMUNITY
End: 2018-12-27

## 2018-10-01 NOTE — PROGRESS NOTES
First prenatal visit  Pap 11/3/2017- [facility]  Cultures today  Nausea improved  Her only issue now is her stomach pain in the morning and when she eats  When she takes tums it just gets worse

## 2018-10-01 NOTE — PROGRESS NOTES
Problem List Items Addressed This Visit        Digestive    Vomiting or nausea of pregnancy     Finally starting to feel better  Some reflux - tums not helping, advised Zantac  Other    Prenatal care, subsequent pregnancy, second trimester - Primary     Patient overall doing well  Has level II US scheduled  Has not yet done PN labs, encouraged her to complete ASAP  Prior [facility] pap, due for repap - this was collected today  Flu shot today              Other Visit Diagnoses     Prenatal care in second trimester        Relevant Orders    Chlamydia/GC amplified DNA by PCR    Liquid-based pap, screening    LGSIL on Pap smear of cervix        Relevant Orders    Liquid-based pap, screening    Need for influenza vaccination        Relevant Orders    influenza vaccine, [id number], quadrivalent, 0 5 mL, preservative-free (SYRINGE, SINGLE-DOSE VIAL), for adult and pediatric patients 3 yr+ (AFLURIA, FLUARIX, FLULAVAL, FLUZONE) (Completed)

## 2018-10-02 LAB
CHLAMYDIA DNA CVX QL NAA+PROBE: NORMAL
N GONORRHOEA DNA GENITAL QL NAA+PROBE: NORMAL

## 2018-10-03 LAB
HPV HR 12 DNA CVX QL NAA+PROBE: NEGATIVE
HPV16 DNA CVX QL NAA+PROBE: NEGATIVE
HPV18 DNA CVX QL NAA+PROBE: NEGATIVE

## 2018-10-04 LAB
LAB AP GYN PRIMARY INTERPRETATION: NORMAL
Lab: NORMAL

## 2018-10-08 ENCOUNTER — TELEPHONE (OUTPATIENT)
Dept: OBGYN CLINIC | Facility: CLINIC | Age: 23
End: 2018-10-08

## 2018-10-08 NOTE — TELEPHONE ENCOUNTER
16 wks 2nd baby, feeling dizzy all day now extremely dizzi with headacche, has been well hydrated and has eaten but still very shaky on feet for 10 hrs today, to  on call

## 2018-10-09 ENCOUNTER — TELEPHONE (OUTPATIENT)
Dept: OBGYN CLINIC | Facility: CLINIC | Age: 23
End: 2018-10-09

## 2018-10-12 ENCOUNTER — TELEPHONE (OUTPATIENT)
Dept: OBGYN CLINIC | Facility: CLINIC | Age: 23
End: 2018-10-12

## 2018-10-12 NOTE — TELEPHONE ENCOUNTER
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

## 2018-10-15 ENCOUNTER — TELEPHONE (OUTPATIENT)
Dept: NEUROLOGY | Facility: CLINIC | Age: 23
End: 2018-10-15

## 2018-10-15 ENCOUNTER — TELEPHONE (OUTPATIENT)
Dept: OBGYN CLINIC | Facility: CLINIC | Age: 23
End: 2018-10-15

## 2018-10-15 NOTE — TELEPHONE ENCOUNTER
Pt wants to speak to manager re a need for a letter that indicates she cant lift over 20lbs repetitively or stands on her feet please advise, see maverick's note

## 2018-10-15 NOTE — TELEPHONE ENCOUNTER
Pt is crying on phone, is in so much pain "from my foot shooting up to my back", states she cannot be on her feet so much at work and she had an incident last week of pain and dizziness  She is requesting a work note as the original one only states she cannot lift more than 20 lbs  She was told over the phone to discuss at her next appt but was not happy with this, states she goes back to work on Saturday and has no idea how she is going to do it  Scheduled pt for appt 10/16

## 2018-10-15 NOTE — TELEPHONE ENCOUNTER
Patient stopped by the office requesting to come in sooner than 01/15/18 appt    Patient stated she got hurt at her job working more than 10 hrs week and she is in pain

## 2018-10-16 ENCOUNTER — ROUTINE PRENATAL (OUTPATIENT)
Dept: OBGYN CLINIC | Facility: CLINIC | Age: 23
End: 2018-10-16

## 2018-10-16 VITALS — DIASTOLIC BLOOD PRESSURE: 60 MMHG | BODY MASS INDEX: 21.35 KG/M2 | WEIGHT: 113 LBS | SYSTOLIC BLOOD PRESSURE: 122 MMHG

## 2018-10-16 DIAGNOSIS — M54.9 BACK PAIN IN PREGNANCY: ICD-10-CM

## 2018-10-16 DIAGNOSIS — M54.5 LOW BACK PAIN, UNSPECIFIED BACK PAIN LATERALITY, UNSPECIFIED CHRONICITY, WITH SCIATICA PRESENCE UNSPECIFIED: Primary | ICD-10-CM

## 2018-10-16 DIAGNOSIS — O99.891 BACK PAIN IN PREGNANCY: ICD-10-CM

## 2018-10-16 PROCEDURE — PNV: Performed by: OBSTETRICS & GYNECOLOGY

## 2018-10-16 NOTE — ASSESSMENT & PLAN NOTE
Patient notes significant left leg and back pain since working this past weekend  She feels that she is unable to do her job as it is currently, she lifts frequently and walks frequently  We discussed that pregnancy is not a reason she can not do those activities - but if she has ongoing back problems, would see if neurology thinks she can continue or not  She has follow up with them soon  They had written her out of work her last pregnancy

## 2018-10-16 NOTE — PROGRESS NOTES
Patient lifts for a living  She is limited to 20lbs by our office  She lifted a box on Saturday but not sure how many pounds the box weighed  On Sunday she woke up in pain and didn't go to work for 2 days  She only works Saturday- Monday 12 hour shifts  Her pain is a "7" out of 10 consistently  Her pain radiates from her foot up to her leg and back

## 2018-10-16 NOTE — PROGRESS NOTES
Problem List Items Addressed This Visit        Other    Back pain - Primary    Back pain in pregnancy     Patient notes significant left leg and back pain since working this past weekend  She feels that she is unable to do her job as it is currently, she lifts frequently and walks frequently  We discussed that pregnancy is not a reason she can not do those activities - but if she has ongoing back problems, would see if neurology thinks she can continue or not  She has follow up with them soon  They had written her out of work her last pregnancy           Relevant Orders    Ambulatory referral to Physical Therapy

## 2018-10-25 ENCOUNTER — OFFICE VISIT (OUTPATIENT)
Dept: NEUROLOGY | Facility: CLINIC | Age: 23
End: 2018-10-25
Payer: COMMERCIAL

## 2018-10-25 VITALS
HEIGHT: 61 IN | HEART RATE: 95 BPM | WEIGHT: 112.8 LBS | DIASTOLIC BLOOD PRESSURE: 70 MMHG | BODY MASS INDEX: 21.3 KG/M2 | RESPIRATION RATE: 12 BRPM | SYSTOLIC BLOOD PRESSURE: 122 MMHG

## 2018-10-25 DIAGNOSIS — M54.16 LUMBAR RADICULOPATHY: Primary | ICD-10-CM

## 2018-10-25 PROCEDURE — 99213 OFFICE O/P EST LOW 20 MIN: CPT | Performed by: PSYCHIATRY & NEUROLOGY
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
## 2018-10-31 ENCOUNTER — APPOINTMENT (OUTPATIENT)
Dept: LAB | Facility: CLINIC | Age: 23
End: 2018-10-31
Payer: COMMERCIAL

## 2018-10-31 ENCOUNTER — OFFICE VISIT (OUTPATIENT)
Dept: PAIN MEDICINE | Facility: CLINIC | Age: 23
End: 2018-10-31
Payer: COMMERCIAL

## 2018-10-31 VITALS
WEIGHT: 112 LBS | RESPIRATION RATE: 16 BRPM | HEIGHT: 61 IN | DIASTOLIC BLOOD PRESSURE: 68 MMHG | SYSTOLIC BLOOD PRESSURE: 106 MMHG | HEART RATE: 96 BPM | BODY MASS INDEX: 21.14 KG/M2

## 2018-10-31 DIAGNOSIS — M54.16 LUMBAR RADICULOPATHY: Primary | ICD-10-CM

## 2018-10-31 DIAGNOSIS — Z34.92 SECOND TRIMESTER PREGNANCY: ICD-10-CM

## 2018-10-31 DIAGNOSIS — M79.18 MYOFASCIAL PAIN SYNDROME: ICD-10-CM

## 2018-10-31 LAB
ABO GROUP BLD: NORMAL
BACTERIA UR QL AUTO: ABNORMAL /HPF
BASOPHILS # BLD AUTO: 0.03 THOUSANDS/ΜL (ref 0–0.1)
BASOPHILS NFR BLD AUTO: 0 % (ref 0–1)
BILIRUB UR QL STRIP: NEGATIVE
BLD GP AB SCN SERPL QL: NEGATIVE
CLARITY UR: ABNORMAL
COLOR UR: YELLOW
EOSINOPHIL # BLD AUTO: 0.07 THOUSAND/ΜL (ref 0–0.61)
EOSINOPHIL NFR BLD AUTO: 1 % (ref 0–6)
ERYTHROCYTE [DISTWIDTH] IN BLOOD BY AUTOMATED COUNT: 14.6 % (ref 11.6–15.1)
GLUCOSE UR STRIP-MCNC: NEGATIVE MG/DL
HCT VFR BLD AUTO: 38.9 % (ref 34.8–46.1)
HGB BLD-MCNC: 12.7 G/DL (ref 11.5–15.4)
HGB UR QL STRIP.AUTO: NEGATIVE
HYALINE CASTS #/AREA URNS LPF: ABNORMAL /LPF
IMM GRANULOCYTES # BLD AUTO: 0.02 THOUSAND/UL (ref 0–0.2)
IMM GRANULOCYTES NFR BLD AUTO: 0 % (ref 0–2)
KETONES UR STRIP-MCNC: NEGATIVE MG/DL
LEUKOCYTE ESTERASE UR QL STRIP: ABNORMAL
LYMPHOCYTES # BLD AUTO: 1.35 THOUSANDS/ΜL (ref 0.6–4.47)
LYMPHOCYTES NFR BLD AUTO: 19 % (ref 14–44)
MCH RBC QN AUTO: 27.9 PG (ref 26.8–34.3)
MCHC RBC AUTO-ENTMCNC: 32.6 G/DL (ref 31.4–37.4)
MCV RBC AUTO: 86 FL (ref 82–98)
MONOCYTES # BLD AUTO: 0.65 THOUSAND/ΜL (ref 0.17–1.22)
MONOCYTES NFR BLD AUTO: 9 % (ref 4–12)
NEUTROPHILS # BLD AUTO: 5.04 THOUSANDS/ΜL (ref 1.85–7.62)
NEUTS SEG NFR BLD AUTO: 71 % (ref 43–75)
NITRITE UR QL STRIP: NEGATIVE
NON-SQ EPI CELLS URNS QL MICRO: ABNORMAL /HPF
NRBC BLD AUTO-RTO: 0 /100 WBCS
PH UR STRIP.AUTO: 6.5 [PH] (ref 4.5–8)
PLATELET # BLD AUTO: 277 THOUSANDS/UL (ref 149–390)
PMV BLD AUTO: 10.1 FL (ref 8.9–12.7)
PROT UR STRIP-MCNC: NEGATIVE MG/DL
RBC # BLD AUTO: 4.55 MILLION/UL (ref 3.81–5.12)
RBC #/AREA URNS AUTO: ABNORMAL /HPF
RH BLD: POSITIVE
RUBV IGG SERPL IA-ACNC: 88.8 IU/ML
SP GR UR STRIP.AUTO: 1.02 (ref 1–1.03)
SPECIMEN EXPIRATION DATE: NORMAL
UROBILINOGEN UR QL STRIP.AUTO: 0.2 E.U./DL
WBC # BLD AUTO: 7.16 THOUSAND/UL (ref 4.31–10.16)
WBC #/AREA URNS AUTO: ABNORMAL /HPF

## 2018-10-31 PROCEDURE — 80081 OBSTETRIC PANEL INC HIV TSTG: CPT

## 2018-10-31 PROCEDURE — 99214 OFFICE O/P EST MOD 30 MIN: CPT | Performed by: ANESTHESIOLOGY

## 2018-10-31 PROCEDURE — 81001 URINALYSIS AUTO W/SCOPE: CPT

## 2018-10-31 PROCEDURE — 87086 URINE CULTURE/COLONY COUNT: CPT

## 2018-10-31 PROCEDURE — 36415 COLL VENOUS BLD VENIPUNCTURE: CPT
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
## 2018-11-01 ENCOUNTER — ROUTINE PRENATAL (OUTPATIENT)
Dept: PERINATAL CARE | Facility: MEDICAL CENTER | Age: 23
End: 2018-11-01
Payer: COMMERCIAL

## 2018-11-01 ENCOUNTER — TELEPHONE (OUTPATIENT)
Dept: OBGYN CLINIC | Facility: CLINIC | Age: 23
End: 2018-11-01

## 2018-11-01 ENCOUNTER — ROUTINE PRENATAL (OUTPATIENT)
Dept: OBGYN CLINIC | Facility: MEDICAL CENTER | Age: 23
End: 2018-11-01

## 2018-11-01 ENCOUNTER — TELEPHONE (OUTPATIENT)
Dept: PAIN MEDICINE | Facility: CLINIC | Age: 23
End: 2018-11-01

## 2018-11-01 VITALS — DIASTOLIC BLOOD PRESSURE: 66 MMHG | BODY MASS INDEX: 21.73 KG/M2 | SYSTOLIC BLOOD PRESSURE: 110 MMHG | WEIGHT: 115 LBS

## 2018-11-01 VITALS
HEIGHT: 61 IN | DIASTOLIC BLOOD PRESSURE: 62 MMHG | BODY MASS INDEX: 22.07 KG/M2 | SYSTOLIC BLOOD PRESSURE: 121 MMHG | WEIGHT: 116.9 LBS | HEART RATE: 91 BPM

## 2018-11-01 DIAGNOSIS — Z87.59 HISTORY OF PRIOR PREGNANCY WITH IUGR NEWBORN: ICD-10-CM

## 2018-11-01 DIAGNOSIS — Z3A.20 20 WEEKS GESTATION OF PREGNANCY: ICD-10-CM

## 2018-11-01 DIAGNOSIS — Z87.59 HISTORY OF PRIOR PREGNANCY WITH IUGR NEWBORN: Primary | ICD-10-CM

## 2018-11-01 DIAGNOSIS — Z34.92 ENCOUNTER FOR SUPERVISION OF NORMAL PREGNANCY IN SECOND TRIMESTER, UNSPECIFIED GRAVIDITY: Primary | ICD-10-CM

## 2018-11-01 DIAGNOSIS — Z34.92 SECOND TRIMESTER PREGNANCY: ICD-10-CM

## 2018-11-01 DIAGNOSIS — Z36.86 ENCOUNTER FOR ANTENATAL SCREENING FOR CERVICAL LENGTH: ICD-10-CM

## 2018-11-01 DIAGNOSIS — Z36.3 ENCOUNTER FOR ANTENATAL SCREENING FOR MALFORMATION USING ULTRASOUND: ICD-10-CM

## 2018-11-01 PROBLEM — Z34.82 PRENATAL CARE, SUBSEQUENT PREGNANCY, SECOND TRIMESTER: Status: RESOLVED | Noted: 2018-08-20 | Resolved: 2018-11-01

## 2018-11-01 LAB
BACTERIA UR CULT: NORMAL
HBV SURFACE AG SER QL: NORMAL
HIV 1+2 AB+HIV1 P24 AG SERPL QL IA: NORMAL
RPR SER QL: NORMAL

## 2018-11-01 PROCEDURE — 76805 OB US >/= 14 WKS SNGL FETUS: CPT | Performed by: OBSTETRICS & GYNECOLOGY

## 2018-11-01 PROCEDURE — 76817 TRANSVAGINAL US OBSTETRIC: CPT | Performed by: OBSTETRICS & GYNECOLOGY

## 2018-11-01 PROCEDURE — 99212 OFFICE O/P EST SF 10 MIN: CPT | Performed by: OBSTETRICS & GYNECOLOGY

## 2018-11-01 PROCEDURE — PNV: Performed by: NURSE PRACTITIONER

## 2018-11-01 NOTE — TELEPHONE ENCOUNTER
Louis Marmolejo returned call from Dr Anni Benitez office; L/M regarding medicine to be used in trigger point injections for sherry  The 2 medications are:   25% of dupivocaine, & 80 mg depo medrol   Routed to [facility]

## 2018-11-01 NOTE — TELEPHONE ENCOUNTER
Lizy De La Garza ob/gyn nurse called and wants to know what medication will be used in the trigger point injection  Pls call her until 5 today at [phone] and you can lm if she doesn't answer

## 2018-11-01 NOTE — TELEPHONE ENCOUNTER
Left detailed message advising SI would use 0 25% bupivicaine and 80mg depomedrol for TPI  Office is to cb if any further information needed

## 2018-11-01 NOTE — TELEPHONE ENCOUNTER
----- Message from Nomr Lund sent at 11/1/2018 11:15 AM EDT -----  Could an OB nurse please reach out to her pain management group for more info on plans for trigger point injections for chronic low back pain? Apparently they are waiting to book her for this once we approve but their note includes no info regarding which med they plan to use  I assume corticosteroid but not certain      Thanks

## 2018-11-01 NOTE — TELEPHONE ENCOUNTER
Contacted [facility]  Inquired about medication that they plan to use for trigger point injections  Office will call back once they are able to discuss with MD   Will await call back  My direct ext given   Routed to [facility]

## 2018-11-02 PROBLEM — Z87.59 HISTORY OF PRIOR PREGNANCY WITH IUGR NEWBORN: Status: ACTIVE | Noted: 2018-11-02

## 2018-11-02 PROBLEM — O09.899 SHORT INTERVAL BETWEEN PREGNANCIES COMPLICATING PREGNANCY, ANTEPARTUM: Status: ACTIVE | Noted: 2018-11-02

## 2018-11-02 NOTE — PROGRESS NOTES
Problem List Items Addressed This Visit     Encounter for supervision of normal pregnancy in second trimester - Primary     Denies OB complaints  Good fetal movement  Denies contractions, cramping, leakage of fluid or vaginal bleeding  S/p flu vaccine  L2 WNL  Reviewed reasons to call  History of prior pregnancy with IUGR      Growth scan is scheduled in 8 weeks

## 2018-11-02 NOTE — TELEPHONE ENCOUNTER
Please notify patient that these medications are approved for use in preg   Plan reviewed with Dr Alycia Cortez and he is inagreement with plan
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
## 2018-11-02 NOTE — ASSESSMENT & PLAN NOTE
Denies OB complaints  Good fetal movement  Denies contractions, cramping, leakage of fluid or vaginal bleeding  S/p flu vaccine  L2 WNL  Reviewed reasons to call

## 2018-11-02 NOTE — TELEPHONE ENCOUNTER
Left voicemail message today @ [phone] per Pt's signed communication consent form in Pt's EHR  Pt informed, via vm message, that   25% of Dupivocaine and 80 mg Depo Medrol are approved for use in pregnancy per [employer]  Pt further informed, via vm message, that treatment plan purposed by Pt's pain management group was reviewed with Dr Nora Magana and he is in agreement with plan per [employer]  Reiterated to Pt, via vm message, that if she has any questions/concerns to contact office

## 2018-11-05 ENCOUNTER — TELEPHONE (OUTPATIENT)
Dept: RADIOLOGY | Facility: CLINIC | Age: 23
End: 2018-11-05

## 2018-11-05 NOTE — TELEPHONE ENCOUNTER
Please schedule pt for Trigger Point Injection  S/w Justine @ [facility] on 11/5/18 9:06am - no auth required for Trigger Point Injection in office setting  Call Ref # [id number]

## 2018-11-09 ENCOUNTER — OFFICE VISIT (OUTPATIENT)
Dept: PAIN MEDICINE | Facility: CLINIC | Age: 23
End: 2018-11-09
Payer: COMMERCIAL

## 2018-11-09 VITALS
SYSTOLIC BLOOD PRESSURE: 100 MMHG | HEART RATE: 84 BPM | BODY MASS INDEX: 21.9 KG/M2 | WEIGHT: 116 LBS | DIASTOLIC BLOOD PRESSURE: 54 MMHG | HEIGHT: 61 IN | RESPIRATION RATE: 18 BRPM

## 2018-11-09 DIAGNOSIS — M79.18 MYOFASCIAL PAIN SYNDROME: Primary | ICD-10-CM

## 2018-11-09 PROCEDURE — 99215 OFFICE O/P EST HI 40 MIN: CPT | Performed by: ANESTHESIOLOGY

## 2018-11-09 PROCEDURE — 20552 NJX 1/MLT TRIGGER POINT 1/2: CPT | Performed by: ANESTHESIOLOGY

## 2018-11-09 RX ORDER — BUPIVACAINE HYDROCHLORIDE 2.5 MG/ML
10 INJECTION, SOLUTION INFILTRATION; PERINEURAL ONCE
Status: COMPLETED | OUTPATIENT
Start: 2018-11-09 | End: 2018-11-09

## 2018-11-09 RX ORDER — METHYLPREDNISOLONE ACETATE 80 MG/ML
80 INJECTION, SUSPENSION INTRA-ARTICULAR; INTRALESIONAL; INTRAMUSCULAR; SOFT TISSUE ONCE
Status: COMPLETED | OUTPATIENT
Start: 2018-11-09 | End: 2018-11-09

## 2018-11-09 RX ADMIN — METHYLPREDNISOLONE ACETATE 80 MG: 80 INJECTION, SUSPENSION INTRA-ARTICULAR; INTRALESIONAL; INTRAMUSCULAR; SOFT TISSUE at 08:26

## 2018-11-09 RX ADMIN — BUPIVACAINE HYDROCHLORIDE 10 ML: 2.5 INJECTION, SOLUTION INFILTRATION; PERINEURAL at 08:25
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
## 2018-11-28 ENCOUNTER — ROUTINE PRENATAL (OUTPATIENT)
Dept: OBGYN CLINIC | Facility: MEDICAL CENTER | Age: 23
End: 2018-11-28

## 2018-11-28 ENCOUNTER — TELEPHONE (OUTPATIENT)
Dept: OBGYN CLINIC | Facility: MEDICAL CENTER | Age: 23
End: 2018-11-28

## 2018-11-28 VITALS
RESPIRATION RATE: 14 BRPM | DIASTOLIC BLOOD PRESSURE: 64 MMHG | WEIGHT: 121.6 LBS | SYSTOLIC BLOOD PRESSURE: 112 MMHG | BODY MASS INDEX: 22.96 KG/M2 | HEIGHT: 61 IN

## 2018-11-28 DIAGNOSIS — Z3A.28 28 WEEKS GESTATION OF PREGNANCY: Primary | ICD-10-CM

## 2018-11-28 PROCEDURE — PNV: Performed by: OBSTETRICS & GYNECOLOGY

## 2018-11-28 NOTE — TELEPHONE ENCOUNTER
Pt would like to have her paperwork to be capped hours at 40 hours  If you have any questions you can leave a message for the pt

## 2018-11-28 NOTE — PROGRESS NOTES
Patient is a 74-year-old female, para 1 at 24 weeks and 3 days here for routine prenatal visit without complaint  Patient would like a note for work states that she does not to work overtime  Review of systems is positive for fetal movement negative for loss of fluid vaginal bleeding or regular contractions

## 2018-12-18 ENCOUNTER — HOSPITAL ENCOUNTER (OUTPATIENT)
Facility: HOSPITAL | Age: 23
Discharge: HOME | End: 2018-12-19
Attending: OBSTETRICS & GYNECOLOGY | Admitting: OBSTETRICS & GYNECOLOGY
Payer: COMMERCIAL

## 2018-12-18 DIAGNOSIS — Z3A.27 27 WEEKS GESTATION OF PREGNANCY: Primary | ICD-10-CM

## 2018-12-18 PROBLEM — Z34.90 PREGNANCY: Status: ACTIVE | Noted: 2018-12-18

## 2018-12-18 LAB — FIBRONECTIN FETAL VAG QL: NEGATIVE

## 2018-12-18 PROCEDURE — 87086 URINE CULTURE/COLONY COUNT: CPT | Performed by: OBSTETRICS & GYNECOLOGY

## 2018-12-18 PROCEDURE — 82731 ASSAY OF FETAL FIBRONECTIN: CPT | Performed by: OBSTETRICS & GYNECOLOGY

## 2018-12-18 PROCEDURE — 81001 URINALYSIS AUTO W/SCOPE: CPT | Performed by: OBSTETRICS & GYNECOLOGY

## 2018-12-18 RX ORDER — CYCLOBENZAPRINE HCL 10 MG
10 TABLET ORAL 3 TIMES DAILY PRN
Status: DISCONTINUED | OUTPATIENT
Start: 2018-12-18 | End: 2018-12-19 | Stop reason: HOSPADM

## 2018-12-18 RX ADMIN — CYCLOBENZAPRINE HYDROCHLORIDE 10 MG: 10 TABLET, FILM COATED ORAL at 23:06

## 2018-12-18 NOTE — LETTER
[address] [place] [place] [address]  Dept: [phone]    December 19, 2018     Patient: Danny Roque   YOB: 1995   Date of Visit: 12/18/2018       To Whom it May Concern:    Danny Roque is under my professional care  She was seen in the hospital from 12/18/2018   to 12/19/18  She may return to work on 12/25/18  If you have any questions or concerns, please don't hesitate to call           Sincerely,          Anh Mascorro MD

## 2018-12-19 VITALS
RESPIRATION RATE: 18 BRPM | OXYGEN SATURATION: 99 % | SYSTOLIC BLOOD PRESSURE: 124 MMHG | TEMPERATURE: 98.4 F | DIASTOLIC BLOOD PRESSURE: 76 MMHG | HEART RATE: 78 BPM

## 2018-12-19 PROBLEM — Z3A.27 27 WEEKS GESTATION OF PREGNANCY: Status: ACTIVE | Noted: 2018-12-18

## 2018-12-19 LAB
BACTERIA UR QL AUTO: ABNORMAL /HPF
BILIRUB UR QL STRIP: NEGATIVE
CLARITY UR: ABNORMAL
COLOR UR: YELLOW
GLUCOSE UR STRIP-MCNC: NEGATIVE MG/DL
HGB UR QL STRIP.AUTO: NEGATIVE
KETONES UR STRIP-MCNC: NEGATIVE MG/DL
LEUKOCYTE ESTERASE UR QL STRIP: ABNORMAL
NITRITE UR QL STRIP: NEGATIVE
NON-SQ EPI CELLS URNS QL MICRO: ABNORMAL /HPF
PH UR STRIP.AUTO: 7 [PH] (ref 4.5–8)
PROT UR STRIP-MCNC: NEGATIVE MG/DL
RBC #/AREA URNS AUTO: ABNORMAL /HPF
SP GR UR STRIP.AUTO: 1.01 (ref 1–1.03)
UROBILINOGEN UR QL STRIP.AUTO: 0.2 E.U./DL
WBC #/AREA URNS AUTO: ABNORMAL /HPF

## 2018-12-19 PROCEDURE — G0463 HOSPITAL OUTPT CLINIC VISIT: HCPCS

## 2018-12-19 PROCEDURE — 99215 OFFICE O/P EST HI 40 MIN: CPT

## 2018-12-19 RX ORDER — CYCLOBENZAPRINE HCL 10 MG
10 TABLET ORAL 3 TIMES DAILY PRN
Qty: 10 TABLET | Refills: 0 | Status: SHIPPED | OUTPATIENT
Start: 2018-12-19 | End: 2019-01-25 | Stop reason: SDUPTHER

## 2018-12-19 NOTE — PROGRESS NOTES
Triage Note - OB  Zorita Cooks 24 y o  female MRN: [id number]  Unit/Bed#: - Encounter: [id number]    OB TRIAGE NOTE  Zorita Cooks  [id number]  2018  7:31 AM  / 312    THIS NOTE IS A LATE ENTRY DUE TO PATIENT CARE      ASSESS:  24 y o   27w3d not in  labor  Patient was given flexeril for pain after which she slept for several hours with no complaints  No nitrates on UA so no concern for UTI at this time  PLAN  - 10 tablets of Flexeril for pain  - Patient to follow up with pain management specialist  - Will give patient work note for the next few days  - D/c home with labor precuations    D/w Dr Alonso Malagon  ______________    SUBJECTIVE    GAVIOTA: Estimated Date of Delivery: 3/17/19    HPI Chronology:  24 y o  [id number] 27w3d presents with complaint of lower abdominal, back, and groin pain for approximately 24 hours  Patient has had chronic pain issues throughout pregnancy and is seen by multiple specialists for pain  Denies urinary symptoms or change in vaginal discharge     Contractions: yes  Leakage: no  Bleeding: no  Fetal Movement: yes    Vitals:   /76 (BP Location: Left arm)   Pulse 78   Temp 98 4 °F (36 9 °C) (Oral)   Resp 18   LMP 2018 (Approximate)   SpO2 99%   There is no height or weight on file to calculate BMI  Review of Systems   Constitutional: Negative for diaphoresis and fever  Respiratory: Negative for apnea, shortness of breath and wheezing  Cardiovascular: Negative for chest pain and palpitations  Gastrointestinal: Positive for abdominal pain  Negative for blood in stool and vomiting  Genitourinary: Negative for dysuria, hematuria, vaginal bleeding and vaginal discharge  Musculoskeletal: Positive for back pain  Neurological: Negative for dizziness and numbness  Physical Exam   Constitutional: She appears well-nourished  HENT:   Head: Normocephalic and atraumatic     Cardiovascular: Normal rate, regular rhythm and normal heart sounds  Pulmonary/Chest: Breath sounds normal  No respiratory distress  She has no wheezes  Abdominal: Bowel sounds are normal  There is no tenderness  There is no rebound  Genitourinary: Vagina normal  No vaginal discharge found          FHT:  Baseline Rate: 140 bpm  Variability: Moderate 6-25 bpm  Accelerations: 15 x 15 or greater  Decelerations: None  FHR Category: Category I  TOCO:   Contraction Frequency (minutes): 0  Contraction Duration (seconds): 60-70    KOH: negative  Wet prep: negative    Cervical length: 3 8cm    SVE: closed/thick/high      Labs:   Recent Results (from the past 24 hour(s))   Fetal fibronectin    Collection Time: 12/18/18 10:27 PM   Result Value Ref Range    Fetal Fibronectin Negative    Urinalysis with microscopic    Collection Time: 12/18/18 10:51 PM   Result Value Ref Range    Clarity, UA Cloudy     Color, UA Yellow     Specific Gravity, UA 1 006 1 003 - 1 030    pH, UA 7 0 4 5 - 8 0    Glucose, UA Negative Negative mg/dl    Ketones, UA Negative Negative mg/dl    Blood, UA Negative Negative    Protein, UA Negative Negative mg/dl    Nitrite, UA Negative Negative    Bilirubin, UA Negative Negative    Urobilinogen, UA 0 2 0 2, 1 0 E U /dl E U /dl    Leukocytes, UA Large (A) Negative    WBC, UA 4-10 (A) None Seen, 0-5, 5-55, 5-65 /hpf    RBC, UA None Seen None Seen, 0-5 /hpf    Bacteria, UA Occasional None Seen, Occasional /hpf    Epithelial Cells Moderate (A) None Seen, Occasional /hpf         Sadia Hilliard MD  12/19/2018  7:31 AM

## 2018-12-19 NOTE — DISCHARGE INSTRUCTIONS
Pregnancy at 27 to 30 [address]:   You may notice new symptoms such as shortness of breath, heartburn, or swelling of your ankles and feet  You may also have trouble sleeping or contractions  DISCHARGE INSTRUCTIONS:   Seek care immediately if:   · You develop a severe headache that does not go away  · You have new or increased vision changes, such as blurred or spotted vision  · You have new or increased swelling in your face or hands  · You have vaginal spotting or bleeding  · Your water broke or you feel warm water gushing or trickling from your vagina  Contact your healthcare provider if:   · You have more than 5 contractions in 1 hour  · You notice any changes in your baby's movements  · You have abdominal cramps, pressure, or tightening  · You have a change in vaginal discharge  · You have chills or a fever  · You have vaginal itching, burning, or pain  · You have yellow, green, white, or foul-smelling vaginal discharge  · You have pain or burning when you urinate, less urine than usual, or pink or bloody urine  · You have questions or concerns about your condition or care  How to care for yourself at this stage of your pregnancy:   · Eat a variety of healthy foods  Healthy foods include fruits, vegetables, whole-grain breads, low-fat dairy foods, beans, lean meats, and fish  Drink liquids as directed  Ask how much liquid to drink each day and which liquids are best for you  Limit caffeine to less than 200 milligrams each day  Limit your intake of fish to 2 servings each week  Choose fish low in mercury such as canned light tuna, shrimp, salmon, cod, or tilapia  Do not  eat fish high in mercury such as swordfish, tilefish, orquidea mackerel, and shark  · Manage heartburn  by eating 4 or 5 small meals each day instead of large meals  Avoid spicy food  · Manage swelling  by lying down and putting your feet up       · Take prenatal vitamins as directed  Your need for certain vitamins and minerals, such as folic acid, increases during pregnancy  Prenatal vitamins provide some of the extra vitamins and minerals you need  Prenatal vitamins may also help to decrease the risk of certain birth defects  · Talk to your healthcare provider about exercise  Moderate exercise can help you stay fit  Your healthcare provider will help you plan an exercise program that is safe for you during pregnancy  · Do not smoke  If you smoke, it is never too late to quit  Smoking increases your risk of a miscarriage and other health problems during your pregnancy  Smoking can cause your baby to be born too early or weigh less at birth  Ask your healthcare provider for information if you need help quitting  · Do not drink alcohol  Alcohol passes from your body to your baby through the placenta  It can affect your baby's brain development and cause fetal alcohol syndrome (FAS)  FAS is a group of conditions that causes mental, behavior, and growth problems  · Talk to your healthcare provider before you take any medicines  Many medicines may harm your baby if you take them when you are pregnant  Do not take any medicines, vitamins, herbs, or supplements without first talking to your healthcare provider  Never use illegal or street drugs (such as marijuana or cocaine) while you are pregnant  Safety tips during pregnancy:   · Avoid hot tubs and saunas  Do not use a hot tub or sauna while you are pregnant, especially during your first trimester  Hot tubs and saunas may raise your baby's temperature and increase the risk of birth defects  · Avoid toxoplasmosis  This is an infection caused by eating raw meat or being around infected cat feces  It can cause birth defects, miscarriages, and other problems  Wash your hands after you touch raw meat  Make sure any meat is well-cooked before you eat it  Avoid raw eggs and unpasteurized milk   Use gloves or ask someone else to clean your cat's litter box while you are pregnant  Changes that are happening with your baby:  By 30 weeks, your baby may weigh more than 3 pounds  Your baby may be about 11 inches long from the top of the head to the rump (baby's bottom)  Your baby's eyes open and close now  Your baby's kicks and movements are more forceful at this time  What you need to know about prenatal care: Your healthcare provider will check your blood pressure and weight  You may also need the following:  · Blood tests  may be done to check for anemia or blood type  · A urine test  may also be done to check for sugar and protein  These can be signs of gestational diabetes or infection  Protein in your urine may also be a sign of preeclampsia  Preeclampsia is a condition that can develop during week 20 or later of your pregnancy  It causes high blood pressure, and it can cause problems with your kidneys and other organs  · A Tdap vaccine and flu vaccine  may be recommended by your healthcare provider  · A gestational diabetes screen  will be done using an oral glucose tolerance test (OGTT)  An OGTT starts with a blood sugar level check after you have not eaten for 8 hours  You are then given a glucose drink  Your blood sugar level is checked after 1 hour, 2 hours, and sometimes 3 hours  Healthcare providers look at how much your blood sugar level increases from the first check  · Fundal height  is a measurement of your uterus to check your baby's growth  This number is usually the same as the number of weeks that you have been pregnant  Your healthcare provider may also check your baby's position  · Your baby's heart rate  will be checked  © 2017 [facility] Information is for End User's use only and may not be sold, redistributed or otherwise used for commercial purposes   All illustrations and images included in CareNotes® are the copyrighted property of [employer]  or [employer]  The above information is an  only  It is not intended as medical advice for individual conditions or treatments  Talk to your doctor, nurse or pharmacist before following any medical regimen to see if it is safe and effective for you

## 2018-12-20 LAB — BACTERIA UR CULT: NORMAL

## 2018-12-24 PROBLEM — Z3A.28 28 WEEKS GESTATION OF PREGNANCY: Status: ACTIVE | Noted: 2018-12-18

## 2018-12-26 ENCOUNTER — ULTRASOUND (OUTPATIENT)
Dept: PERINATAL CARE | Facility: MEDICAL CENTER | Age: 23
End: 2018-12-26
Payer: COMMERCIAL

## 2018-12-26 VITALS
DIASTOLIC BLOOD PRESSURE: 74 MMHG | SYSTOLIC BLOOD PRESSURE: 120 MMHG | HEART RATE: 88 BPM | WEIGHT: 123.2 LBS | BODY MASS INDEX: 23.26 KG/M2 | HEIGHT: 61 IN

## 2018-12-26 DIAGNOSIS — Z3A.28 28 WEEKS GESTATION OF PREGNANCY: ICD-10-CM

## 2018-12-26 DIAGNOSIS — Z36.89 ENCOUNTER FOR ULTRASOUND TO CHECK FETAL GROWTH: ICD-10-CM

## 2018-12-26 DIAGNOSIS — Z87.59 HISTORY OF PRIOR PREGNANCY WITH IUGR NEWBORN: ICD-10-CM

## 2018-12-26 DIAGNOSIS — O09.899 SHORT INTERVAL BETWEEN PREGNANCIES COMPLICATING PREGNANCY, ANTEPARTUM: Primary | ICD-10-CM

## 2018-12-26 PROCEDURE — 76816 OB US FOLLOW-UP PER FETUS: CPT | Performed by: OBSTETRICS & GYNECOLOGY

## 2018-12-26 NOTE — PROGRESS NOTES
[address] [facility]3] [place]: Ms Nayla Beck was seen today at 28w3d for fetal growth assessment ultrasound  See ultrasound report under "OB Procedures" tab  Please don't hesitate to contact our office with any concerns or questions    Vince Contreras MD

## 2018-12-26 NOTE — PATIENT INSTRUCTIONS
Thank you for choosing [facility] for your  care today  If you have any questions about your ultrasound or care, please do not hesitate to contact us or your primary obstetrician

## 2018-12-27 ENCOUNTER — TELEPHONE (OUTPATIENT)
Dept: OBGYN CLINIC | Facility: CLINIC | Age: 23
End: 2018-12-27

## 2018-12-27 ENCOUNTER — ROUTINE PRENATAL (OUTPATIENT)
Dept: OBGYN CLINIC | Facility: MEDICAL CENTER | Age: 23
End: 2018-12-27
Payer: COMMERCIAL

## 2018-12-27 VITALS — BODY MASS INDEX: 23.66 KG/M2 | SYSTOLIC BLOOD PRESSURE: 116 MMHG | WEIGHT: 125.2 LBS | DIASTOLIC BLOOD PRESSURE: 66 MMHG

## 2018-12-27 DIAGNOSIS — Z23 NEED FOR TETANUS, DIPHTHERIA, AND ACELLULAR PERTUSSIS (TDAP) VACCINE IN PATIENT OF ADOLESCENT AGE OR OLDER: ICD-10-CM

## 2018-12-27 DIAGNOSIS — M54.9 BACK PAIN IN PREGNANCY: ICD-10-CM

## 2018-12-27 DIAGNOSIS — O99.891 BACK PAIN IN PREGNANCY: ICD-10-CM

## 2018-12-27 DIAGNOSIS — Z34.93 ENCOUNTER FOR SUPERVISION OF NORMAL PREGNANCY IN THIRD TRIMESTER, UNSPECIFIED GRAVIDITY: Primary | ICD-10-CM

## 2018-12-27 DIAGNOSIS — Z3A.28 28 WEEKS GESTATION OF PREGNANCY: ICD-10-CM

## 2018-12-27 PROCEDURE — 90471 IMMUNIZATION ADMIN: CPT

## 2018-12-27 PROCEDURE — PNV: Performed by: NURSE PRACTITIONER

## 2018-12-27 PROCEDURE — 90715 TDAP VACCINE 7 YRS/> IM: CPT

## 2018-12-27 NOTE — ASSESSMENT & PLAN NOTE
The patient is requesting completion of paperwork for employer - this is excusing her from work for the week, per Dr Nanda Perez on L&D  To my knowledge we have not received this in the office - the patient was asked to contact employer and inquire about where and when it was sent, then call us with further info  She reports ongoing low back and pelvic pain  Scheduled to see pain management specialist in about 4 weeks  Steroid injection was somewhat effective  She is seeing PT  - also somewhat helpful but she declines use of support belt  Flexeril was ineffective  Jovani Mckoy expresses interest in being taken out of work until after delivery  Recommended follow up with pain management to discuss work restrictions for this issue, given that they are managing her chronic pain and that this is not an obstetrical issue  She agrees to plan and will contact them

## 2018-12-27 NOTE — TELEPHONE ENCOUNTER
----- Message from Dano Escobar sent at 12/27/2018 10:40 AM EST -----  Patient called and said her appointments were being billed wrong and that she was being charged for her Ob appointments

## 2018-12-27 NOTE — TELEPHONE ENCOUNTER
Ultrasound is outside global pregnancy visits, should be billed separately however it looks like her [facility] was also active at that time  Need to have billing department bill the balance to [employer]  The vaccine visit the EOB came back as Insurance termed  I will have billing department resend as it looks now to have been active from 08/04/2018-current

## 2018-12-27 NOTE — PROGRESS NOTES
Patient had flu shot, Tdap given  Tdap Adacel 0 5mL  Lot [id number]  Exp 10/25/2020  NDC 28624-909-32  Left Deltoid

## 2018-12-27 NOTE — PROGRESS NOTES
Problem List Items Addressed This Visit     Back pain in pregnancy     The patient is requesting completion of paperwork for employer - this is excusing her from work for the week, per Dr Zuniga Comment on L&D  To my knowledge we have not received this in the office - the patient was asked to contact employer and inquire about where and when it was sent, then call us with further info  She reports ongoing low back and pelvic pain  Scheduled to see pain management specialist in about 4 weeks  Steroid injection was somewhat effective  She is seeing PT  - also somewhat helpful but she declines use of support belt  Flexeril was ineffective  Vahe Novak expresses interest in being taken out of work until after delivery  Recommended follow up with pain management to discuss work restrictions for this issue, given that they are managing her chronic pain and that this is not an obstetrical issue  She agrees to plan and will contact them  Encounter for supervision of normal pregnancy in third trimester - Primary     Denies OB complaints  Good fetal movement  Denies contractions, cramping, leakage of fluid or vaginal bleeding  Encouraged to complete 28 wk labs  Tdap was administered today  S/p flu vaccine  Baby and Me considerations reinforced  Reviewed  labor precautions and FKCs              28 weeks gestation of pregnancy      Other Visit Diagnoses     Need for tetanus, diphtheria, and acellular pertussis (Tdap) vaccine in patient of adolescent age or older        Relevant Orders    TDAP VACCINE GREATER THAN OR EQUAL TO 6YO IM

## 2018-12-27 NOTE — ASSESSMENT & PLAN NOTE
Denies OB complaints  Good fetal movement  Denies contractions, cramping, leakage of fluid or vaginal bleeding  Encouraged to complete 28 wk labs  Tdap was administered today  S/p flu vaccine  Baby and Me considerations reinforced  Reviewed  labor precautions and FKCs

## 2019-01-03 ENCOUNTER — TELEPHONE (OUTPATIENT)
Dept: OBGYN CLINIC | Facility: CLINIC | Age: 24
End: 2019-01-03

## 2019-01-03 NOTE — TELEPHONE ENCOUNTER
Terence,    Work paperwork has been received for Miryam Daugherty  Please advise on fill out  Thank you

## 2019-01-03 NOTE — TELEPHONE ENCOUNTER
She was advised that since her pain is unrelated to ob issue she should follow up with her pain management specialist and/or neuro for documentation/recommendations regarding going out of work

## 2019-01-11 ENCOUNTER — APPOINTMENT (OUTPATIENT)
Dept: LAB | Facility: MEDICAL CENTER | Age: 24
End: 2019-01-11
Payer: COMMERCIAL

## 2019-01-11 ENCOUNTER — ROUTINE PRENATAL (OUTPATIENT)
Dept: OBGYN CLINIC | Facility: MEDICAL CENTER | Age: 24
End: 2019-01-11

## 2019-01-11 VITALS — SYSTOLIC BLOOD PRESSURE: 102 MMHG | WEIGHT: 126.6 LBS | DIASTOLIC BLOOD PRESSURE: 58 MMHG | BODY MASS INDEX: 23.92 KG/M2

## 2019-01-11 DIAGNOSIS — Z34.93 THIRD TRIMESTER PREGNANCY: ICD-10-CM

## 2019-01-11 DIAGNOSIS — Z34.93 THIRD TRIMESTER PREGNANCY: Primary | ICD-10-CM

## 2019-01-11 DIAGNOSIS — Z3A.30 30 WEEKS GESTATION OF PREGNANCY: ICD-10-CM

## 2019-01-11 LAB
BASOPHILS # BLD AUTO: 0.02 THOUSANDS/ΜL (ref 0–0.1)
BASOPHILS NFR BLD AUTO: 0 % (ref 0–1)
EOSINOPHIL # BLD AUTO: 0.04 THOUSAND/ΜL (ref 0–0.61)
EOSINOPHIL NFR BLD AUTO: 1 % (ref 0–6)
ERYTHROCYTE [DISTWIDTH] IN BLOOD BY AUTOMATED COUNT: 12.4 % (ref 11.6–15.1)
GLUCOSE 1H P 50 G GLC PO SERPL-MCNC: 68 MG/DL
HCT VFR BLD AUTO: 35 % (ref 34.8–46.1)
HGB BLD-MCNC: 11 G/DL (ref 11.5–15.4)
IMM GRANULOCYTES # BLD AUTO: 0.03 THOUSAND/UL (ref 0–0.2)
IMM GRANULOCYTES NFR BLD AUTO: 0 % (ref 0–2)
LYMPHOCYTES # BLD AUTO: 1.54 THOUSANDS/ΜL (ref 0.6–4.47)
LYMPHOCYTES NFR BLD AUTO: 19 % (ref 14–44)
MCH RBC QN AUTO: 27.4 PG (ref 26.8–34.3)
MCHC RBC AUTO-ENTMCNC: 31.4 G/DL (ref 31.4–37.4)
MCV RBC AUTO: 87 FL (ref 82–98)
MONOCYTES # BLD AUTO: 0.98 THOUSAND/ΜL (ref 0.17–1.22)
MONOCYTES NFR BLD AUTO: 12 % (ref 4–12)
NEUTROPHILS # BLD AUTO: 5.46 THOUSANDS/ΜL (ref 1.85–7.62)
NEUTS SEG NFR BLD AUTO: 68 % (ref 43–75)
NRBC BLD AUTO-RTO: 0 /100 WBCS
PLATELET # BLD AUTO: 281 THOUSANDS/UL (ref 149–390)
PMV BLD AUTO: 9.8 FL (ref 8.9–12.7)
RBC # BLD AUTO: 4.02 MILLION/UL (ref 3.81–5.12)
WBC # BLD AUTO: 8.07 THOUSAND/UL (ref 4.31–10.16)

## 2019-01-11 PROCEDURE — 36415 COLL VENOUS BLD VENIPUNCTURE: CPT

## 2019-01-11 PROCEDURE — 85025 COMPLETE CBC W/AUTO DIFF WBC: CPT

## 2019-01-11 PROCEDURE — 82950 GLUCOSE TEST: CPT

## 2019-01-11 PROCEDURE — PNV: Performed by: OBSTETRICS & GYNECOLOGY

## 2019-01-11 PROCEDURE — 86592 SYPHILIS TEST NON-TREP QUAL: CPT

## 2019-01-11 NOTE — PROGRESS NOTES
O pos, s/p flu and Tdap, I gave 28 week labs again  Does not need breast pump order, check in card given

## 2019-01-11 NOTE — PROGRESS NOTES
Problem List Items Addressed This Visit        Other    30 weeks gestation of pregnancy     Patient beginning to notes some round ligament discomfort  She has no other complaints at this time  [facility] had [facility] pregnancy  Patient will have her 28 week laboratory studies performed today additional lab sheet was given to her           Third trimester pregnancy - Primary    Relevant Orders    CBC and differential    Glucose, 1H PG    RPR

## 2019-01-11 NOTE — ASSESSMENT & PLAN NOTE
Patient beginning to notes some round ligament discomfort  She has no other complaints at this time  She [facility] she had previous IUGR pregnancy  Patient will have her 28 week laboratory studies performed today additional lab sheet was given to her

## 2019-01-14 ENCOUNTER — TELEPHONE (OUTPATIENT)
Dept: OBGYN CLINIC | Facility: CLINIC | Age: 24
End: 2019-01-14

## 2019-01-14 LAB — RPR SER QL: NORMAL

## 2019-01-14 NOTE — TELEPHONE ENCOUNTER
----- Message from Zechariah Roque DO sent at 1/14/2019  9:39 AM EST -----  Results within normal limits  Please inform patient

## 2019-01-15 ENCOUNTER — OFFICE VISIT (OUTPATIENT)
Dept: NEUROLOGY | Facility: CLINIC | Age: 24
End: 2019-01-15
Payer: COMMERCIAL

## 2019-01-15 VITALS
WEIGHT: 128.8 LBS | BODY MASS INDEX: 24.32 KG/M2 | HEIGHT: 61 IN | SYSTOLIC BLOOD PRESSURE: 108 MMHG | DIASTOLIC BLOOD PRESSURE: 60 MMHG | HEART RATE: 121 BPM

## 2019-01-15 DIAGNOSIS — M54.16 LUMBAR RADICULOPATHY: Primary | ICD-10-CM

## 2019-01-15 PROCEDURE — 99213 OFFICE O/P EST LOW 20 MIN: CPT | Performed by: PSYCHIATRY & NEUROLOGY
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
## 2019-01-23 ENCOUNTER — ROUTINE PRENATAL (OUTPATIENT)
Dept: OBGYN CLINIC | Facility: CLINIC | Age: 24
End: 2019-01-23

## 2019-01-23 VITALS — BODY MASS INDEX: 24.56 KG/M2 | WEIGHT: 130 LBS | SYSTOLIC BLOOD PRESSURE: 110 MMHG | DIASTOLIC BLOOD PRESSURE: 64 MMHG

## 2019-01-23 DIAGNOSIS — Z87.59 HISTORY OF PRIOR PREGNANCY WITH IUGR NEWBORN: Primary | ICD-10-CM

## 2019-01-23 DIAGNOSIS — Z34.83 ENCOUNTER FOR SUPERVISION OF OTHER NORMAL PREGNANCY IN THIRD TRIMESTER: ICD-10-CM

## 2019-01-23 DIAGNOSIS — M54.9 BACK PAIN IN PREGNANCY: ICD-10-CM

## 2019-01-23 DIAGNOSIS — O99.891 BACK PAIN IN PREGNANCY: ICD-10-CM

## 2019-01-23 PROBLEM — Z3A.30 30 WEEKS GESTATION OF PREGNANCY: Status: RESOLVED | Noted: 2018-12-18 | Resolved: 2019-01-23

## 2019-01-23 PROBLEM — O21.9 VOMITING OR NAUSEA OF PREGNANCY: Status: RESOLVED | Noted: 2018-08-14 | Resolved: 2019-01-23

## 2019-01-23 PROBLEM — Z34.93 THIRD TRIMESTER PREGNANCY: Status: RESOLVED | Noted: 2019-01-11 | Resolved: 2019-01-23

## 2019-01-23 PROCEDURE — PNV: Performed by: PHYSICIAN ASSISTANT

## 2019-01-23 RX ORDER — AMOXICILLIN 500 MG/1
500 CAPSULE ORAL
COMMUNITY
Start: 2019-01-18 | End: 2019-01-28

## 2019-01-23 NOTE — PROGRESS NOTES
Problem List Items Addressed This Visit        Other    Back pain in pregnancy     Continues to follow with Pain Management who has taken her out of work - she says the claim was accepted  She has follow-up with them         Encounter for supervision of normal pregnancy in third trimester     Overall doing well with pregnancy  Very fatigued, has 10 month old son at home  Good fetal movement "too much movement"  Says she is ready to be done - doesn't want to go to 40 weeks  Last pregnancy with IOL at 37 weeks for IUGR  Feels she has no more room for baby to grow - feels like her skin is "ripping" from stretching so much  Discussed potential for 39 week IOL if favorable cervix at that time, cannot be guaranteed  28 week labs normal           History of prior pregnancy with IUGR  - Primary     Hx of 37 week IOL for IUGR  Has growth scan planned

## 2019-01-23 NOTE — ASSESSMENT & PLAN NOTE
Continues to follow with Pain Management who has taken her out of work - she says the claim was accepted  She has follow-up with them

## 2019-01-23 NOTE — ASSESSMENT & PLAN NOTE
Overall doing well with pregnancy  Very fatigued, has 10 month old son at home  Good fetal movement "too much movement"  Says she is ready to be done - doesn't want to go to 40 weeks  Last pregnancy with IOL at 37 weeks for IUGR  Feels she has no more room for baby to grow - feels like her skin is "ripping" from stretching so much  Discussed potential for 39 week IOL if favorable cervix at that time, cannot be guaranteed  28 week labs normal

## 2019-01-25 ENCOUNTER — OFFICE VISIT (OUTPATIENT)
Dept: PAIN MEDICINE | Facility: CLINIC | Age: 24
End: 2019-01-25
Payer: COMMERCIAL

## 2019-01-25 VITALS
DIASTOLIC BLOOD PRESSURE: 64 MMHG | SYSTOLIC BLOOD PRESSURE: 106 MMHG | WEIGHT: 130 LBS | RESPIRATION RATE: 14 BRPM | BODY MASS INDEX: 24.55 KG/M2 | HEART RATE: 82 BPM | HEIGHT: 61 IN

## 2019-01-25 DIAGNOSIS — G89.4 CHRONIC PAIN SYNDROME: ICD-10-CM

## 2019-01-25 DIAGNOSIS — M54.16 LUMBAR RADICULOPATHY: Primary | ICD-10-CM

## 2019-01-25 DIAGNOSIS — Z3A.27 27 WEEKS GESTATION OF PREGNANCY: ICD-10-CM

## 2019-01-25 PROCEDURE — 99214 OFFICE O/P EST MOD 30 MIN: CPT | Performed by: ANESTHESIOLOGY

## 2019-01-25 RX ORDER — CYCLOBENZAPRINE HCL 10 MG
10 TABLET ORAL DAILY PRN
Qty: 30 TABLET | Refills: 0 | Status: SHIPPED | OUTPATIENT
Start: 2019-01-25 | End: 2019-01-25 | Stop reason: SDUPTHER

## 2019-01-25 RX ORDER — CYCLOBENZAPRINE HCL 10 MG
10 TABLET ORAL DAILY PRN
Qty: 30 TABLET | Refills: 0 | Status: SHIPPED | OUTPATIENT
Start: 2019-01-25 | End: 2019-06-13 | Stop reason: ALTCHOICE
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
## 2019-02-05 ENCOUNTER — ROUTINE PRENATAL (OUTPATIENT)
Dept: OBGYN CLINIC | Facility: CLINIC | Age: 24
End: 2019-02-05

## 2019-02-05 VITALS — SYSTOLIC BLOOD PRESSURE: 104 MMHG | WEIGHT: 132 LBS | BODY MASS INDEX: 24.94 KG/M2 | DIASTOLIC BLOOD PRESSURE: 60 MMHG

## 2019-02-05 DIAGNOSIS — M54.9 BACK PAIN IN PREGNANCY: ICD-10-CM

## 2019-02-05 DIAGNOSIS — Z34.83 ENCOUNTER FOR SUPERVISION OF OTHER NORMAL PREGNANCY IN THIRD TRIMESTER: Primary | ICD-10-CM

## 2019-02-05 DIAGNOSIS — O99.891 BACK PAIN IN PREGNANCY: ICD-10-CM

## 2019-02-05 DIAGNOSIS — Z87.59 HISTORY OF PRIOR PREGNANCY WITH IUGR NEWBORN: ICD-10-CM

## 2019-02-05 PROCEDURE — PNV: Performed by: OBSTETRICS & GYNECOLOGY

## 2019-02-05 NOTE — ASSESSMENT & PLAN NOTE
Baby currently in breech presentation - discussed ECV if continues in breech presentation at 36 weeks  Patient is amenable as would prefer to avoid

## 2019-02-05 NOTE — PROGRESS NOTES
Problem List Items Addressed This Visit     Back pain in pregnancy     Continues to cope with conservative measures         Encounter for supervision of normal pregnancy in third trimester - Primary     Baby currently in breech presentation - discussed ECV if continues in breech presentation at 36 weeks  Patient is amenable as would prefer to avoid   History of prior pregnancy with IUGR      Patient has growth scan at [address]  [facility] next week to follow up; last EFW 31%             Patient mostly ready at home  Tired but coping

## 2019-02-15 ENCOUNTER — ULTRASOUND (OUTPATIENT)
Dept: PERINATAL CARE | Facility: MEDICAL CENTER | Age: 24
End: 2019-02-15
Payer: COMMERCIAL

## 2019-02-15 VITALS
HEIGHT: 61 IN | SYSTOLIC BLOOD PRESSURE: 130 MMHG | BODY MASS INDEX: 25.6 KG/M2 | HEART RATE: 123 BPM | DIASTOLIC BLOOD PRESSURE: 86 MMHG | WEIGHT: 135.6 LBS

## 2019-02-15 DIAGNOSIS — Z3A.35 35 WEEKS GESTATION OF PREGNANCY: Primary | ICD-10-CM

## 2019-02-15 DIAGNOSIS — O09.899 SHORT INTERVAL BETWEEN PREGNANCIES COMPLICATING PREGNANCY, ANTEPARTUM: ICD-10-CM

## 2019-02-15 DIAGNOSIS — Z87.59 HISTORY OF PRIOR PREGNANCY WITH IUGR NEWBORN: ICD-10-CM

## 2019-02-15 DIAGNOSIS — Z34.83 ENCOUNTER FOR SUPERVISION OF OTHER NORMAL PREGNANCY IN THIRD TRIMESTER: ICD-10-CM

## 2019-02-15 PROCEDURE — 76816 OB US FOLLOW-UP PER FETUS: CPT | Performed by: OBSTETRICS & GYNECOLOGY

## 2019-02-15 NOTE — PROGRESS NOTES
Helen Cole is here today for follow-up growth scan  She had a prior baby with IUGR  Her ultrasound today shows normal interval fetal growth  Helen Cole is only 5 foot one  The fetal head circumference and femur length are in the 5th to 7th percentile respectively and the baby's abdomen is in the 45th percentile  The smaller leg and head circumference are likely secondary to family genetics  The intracranial anatomy appears normal  At her visit today she is wearing a face mask  She was seen today for symptoms of of a viral illness by her PCP  She reports that she did receive a flu shot this pregnancy  She denies any high fevers or any signs of  labor  She reports very active fetal movement  No further follow-up ultrasounds recommended at this time    Marquis Jill MD

## 2019-02-18 ENCOUNTER — TELEPHONE (OUTPATIENT)
Dept: OBGYN CLINIC | Facility: CLINIC | Age: 24
End: 2019-02-18

## 2019-02-18 ENCOUNTER — APPOINTMENT (OUTPATIENT)
Dept: LAB | Facility: CLINIC | Age: 24
End: 2019-02-18
Payer: COMMERCIAL

## 2019-02-18 DIAGNOSIS — Z3A.36 36 WEEKS GESTATION OF PREGNANCY: Primary | ICD-10-CM

## 2019-02-18 DIAGNOSIS — Z3A.36 36 WEEKS GESTATION OF PREGNANCY: ICD-10-CM

## 2019-02-18 LAB
BASOPHILS # BLD AUTO: 0.02 THOUSANDS/ΜL (ref 0–0.1)
BASOPHILS NFR BLD AUTO: 0 % (ref 0–1)
EOSINOPHIL # BLD AUTO: 0.02 THOUSAND/ΜL (ref 0–0.61)
EOSINOPHIL NFR BLD AUTO: 0 % (ref 0–6)
ERYTHROCYTE [DISTWIDTH] IN BLOOD BY AUTOMATED COUNT: 13.5 % (ref 11.6–15.1)
HCT VFR BLD AUTO: 34.6 % (ref 34.8–46.1)
HGB BLD-MCNC: 10.7 G/DL (ref 11.5–15.4)
IMM GRANULOCYTES # BLD AUTO: 0.02 THOUSAND/UL (ref 0–0.2)
IMM GRANULOCYTES NFR BLD AUTO: 0 % (ref 0–2)
LYMPHOCYTES # BLD AUTO: 1.4 THOUSANDS/ΜL (ref 0.6–4.47)
LYMPHOCYTES NFR BLD AUTO: 23 % (ref 14–44)
MCH RBC QN AUTO: 26.4 PG (ref 26.8–34.3)
MCHC RBC AUTO-ENTMCNC: 30.9 G/DL (ref 31.4–37.4)
MCV RBC AUTO: 85 FL (ref 82–98)
MONOCYTES # BLD AUTO: 0.57 THOUSAND/ΜL (ref 0.17–1.22)
MONOCYTES NFR BLD AUTO: 9 % (ref 4–12)
NEUTROPHILS # BLD AUTO: 4.08 THOUSANDS/ΜL (ref 1.85–7.62)
NEUTS SEG NFR BLD AUTO: 68 % (ref 43–75)
NRBC BLD AUTO-RTO: 0 /100 WBCS
PLATELET # BLD AUTO: 282 THOUSANDS/UL (ref 149–390)
PMV BLD AUTO: 10 FL (ref 8.9–12.7)
RBC # BLD AUTO: 4.05 MILLION/UL (ref 3.81–5.12)
WBC # BLD AUTO: 6.11 THOUSAND/UL (ref 4.31–10.16)

## 2019-02-18 PROCEDURE — 85025 COMPLETE CBC W/AUTO DIFF WBC: CPT

## 2019-02-18 PROCEDURE — 80053 COMPREHEN METABOLIC PANEL: CPT

## 2019-02-18 PROCEDURE — 36415 COLL VENOUS BLD VENIPUNCTURE: CPT

## 2019-02-18 PROCEDURE — 82240 BILE ACIDS CHOLYLGLYCINE: CPT

## 2019-02-18 NOTE — TELEPHONE ENCOUNTER
Pt called office today, left voicemail message  Pt's GAVIOTA is 3/17/19, GA 36 wks + 1 day  Pt saw   [phone]  [facility] Tammy on 2/5/19, scheduled to see WING Chapin on 2/19/19  Pt states she is having a "pregnancy issue", wants to speak with a nurse  Pt can be reached @ [phone]

## 2019-02-18 NOTE — TELEPHONE ENCOUNTER
Per Dr Concetta Scott: send pt for labs - bile acids, cmp and cbc  Even if pt has appointment scheduled for tomorrow - she wants patient to go for the b/w today  Orders in pt chart

## 2019-02-18 NOTE — TELEPHONE ENCOUNTER
Called pt back - explained to her that CT7 would like her to go for b/w today  Advised the orders are in her chart  She will go for them today and keep her appointment for tomorrow

## 2019-02-18 NOTE — TELEPHONE ENCOUNTER
Spoke with pt - 36w1d,  - today she started to have itching all over her body but the worse is on her arms, palms and feet  No raised bumps or blemishes  No fever or chills or heart palpitations  She tried using benadryl and eczema lotion - nothing has helped  Please advise  Thanks!

## 2019-02-19 ENCOUNTER — TELEPHONE (OUTPATIENT)
Dept: OBGYN CLINIC | Facility: CLINIC | Age: 24
End: 2019-02-19

## 2019-02-19 ENCOUNTER — ROUTINE PRENATAL (OUTPATIENT)
Dept: OBGYN CLINIC | Facility: MEDICAL CENTER | Age: 24
End: 2019-02-19

## 2019-02-19 ENCOUNTER — OFFICE VISIT (OUTPATIENT)
Dept: PERINATAL CARE | Facility: MEDICAL CENTER | Age: 24
End: 2019-02-19
Payer: COMMERCIAL

## 2019-02-19 VITALS
WEIGHT: 137 LBS | HEIGHT: 61 IN | BODY MASS INDEX: 25.86 KG/M2 | RESPIRATION RATE: 14 BRPM | DIASTOLIC BLOOD PRESSURE: 70 MMHG | SYSTOLIC BLOOD PRESSURE: 110 MMHG

## 2019-02-19 VITALS
HEIGHT: 61 IN | WEIGHT: 137 LBS | SYSTOLIC BLOOD PRESSURE: 133 MMHG | DIASTOLIC BLOOD PRESSURE: 83 MMHG | HEART RATE: 106 BPM | BODY MASS INDEX: 25.86 KG/M2

## 2019-02-19 DIAGNOSIS — Z3A.36 36 WEEKS GESTATION OF PREGNANCY: ICD-10-CM

## 2019-02-19 DIAGNOSIS — Z3A.36 36 WEEKS GESTATION OF PREGNANCY: Primary | ICD-10-CM

## 2019-02-19 DIAGNOSIS — K83.1 CHOLESTASIS OF PREGNANCY, ANTEPARTUM: Primary | ICD-10-CM

## 2019-02-19 DIAGNOSIS — O26.619 CHOLESTASIS OF PREGNANCY, ANTEPARTUM: Primary | ICD-10-CM

## 2019-02-19 DIAGNOSIS — Z34.83 ENCOUNTER FOR SUPERVISION OF OTHER NORMAL PREGNANCY IN THIRD TRIMESTER: ICD-10-CM

## 2019-02-19 DIAGNOSIS — O99.713 PRURITUS OF PREGNANCY IN THIRD TRIMESTER: ICD-10-CM

## 2019-02-19 DIAGNOSIS — L29.9 PRURITUS OF PREGNANCY IN THIRD TRIMESTER: ICD-10-CM

## 2019-02-19 LAB
ALBUMIN SERPL BCP-MCNC: 2.4 G/DL (ref 3.5–5)
ALP SERPL-CCNC: 156 U/L (ref 46–116)
ALT SERPL W P-5'-P-CCNC: 23 U/L (ref 12–78)
ANION GAP SERPL CALCULATED.3IONS-SCNC: 7 MMOL/L (ref 4–13)
AST SERPL W P-5'-P-CCNC: 21 U/L (ref 5–45)
BILIRUB SERPL-MCNC: 0.32 MG/DL (ref 0.2–1)
BUN SERPL-MCNC: 6 MG/DL (ref 5–25)
CALCIUM SERPL-MCNC: 8.7 MG/DL (ref 8.3–10.1)
CHLORIDE SERPL-SCNC: 104 MMOL/L (ref 100–108)
CO2 SERPL-SCNC: 26 MMOL/L (ref 21–32)
CREAT SERPL-MCNC: 0.43 MG/DL (ref 0.6–1.3)
GFR SERPL CREATININE-BSD FRML MDRD: 143 ML/MIN/1.73SQ M
GLUCOSE SERPL-MCNC: 84 MG/DL (ref 65–140)
POTASSIUM SERPL-SCNC: 3.9 MMOL/L (ref 3.5–5.3)
PROT SERPL-MCNC: 6.8 G/DL (ref 6.4–8.2)
SODIUM SERPL-SCNC: 137 MMOL/L (ref 136–145)

## 2019-02-19 PROCEDURE — 59025 FETAL NON-STRESS TEST: CPT | Performed by: OBSTETRICS & GYNECOLOGY

## 2019-02-19 PROCEDURE — 76815 OB US LIMITED FETUS(S): CPT | Performed by: OBSTETRICS & GYNECOLOGY

## 2019-02-19 PROCEDURE — 87653 STREP B DNA AMP PROBE: CPT | Performed by: PHYSICIAN ASSISTANT

## 2019-02-19 PROCEDURE — PNV: Performed by: PHYSICIAN ASSISTANT

## 2019-02-19 PROCEDURE — 99213 OFFICE O/P EST LOW 20 MIN: CPT | Performed by: OBSTETRICS & GYNECOLOGY

## 2019-02-19 RX ORDER — URSODIOL 300 MG/1
300 CAPSULE ORAL 2 TIMES DAILY
Qty: 28 CAPSULE | Refills: 0 | Status: SHIPPED | OUTPATIENT
Start: 2019-02-19 | End: 2019-06-13 | Stop reason: ALTCHOICE

## 2019-02-19 RX ORDER — BETAMETHASONE SODIUM PHOSPHATE AND BETAMETHASONE ACETATE 3; 3 MG/ML; MG/ML
12 INJECTION, SUSPENSION INTRA-ARTICULAR; INTRALESIONAL; INTRAMUSCULAR; SOFT TISSUE EVERY 24 HOURS
Status: COMPLETED | OUTPATIENT
Start: 2019-02-19 | End: 2019-02-20

## 2019-02-19 RX ADMIN — BETAMETHASONE SODIUM PHOSPHATE AND BETAMETHASONE ACETATE 12 MG: 3; 3 INJECTION, SUSPENSION INTRA-ARTICULAR; INTRALESIONAL; INTRAMUSCULAR at 16:00

## 2019-02-19 NOTE — ASSESSMENT & PLAN NOTE
Bile acids pending, slight elevation of alk phos and albumin  Sent to [facility] for consult regarding early induction if cholestasis

## 2019-02-19 NOTE — ASSESSMENT & PLAN NOTE
Vertex presentation  Sent for NST/PATSY due to tachycardia and intense pruritus  RTO 1 wk  GBS done today  rec benedryl to help with itching  Reviewed labor precautions, fetal kick counts and reasons to call

## 2019-02-19 NOTE — PROGRESS NOTES
[id number] [facility] Tammy: Ms Johnathon Perkins was seen today at 36w2d for NST (found under the pregnancy episode) which I reviewed the RN assessment and agree, and PATSY (see ultrasound report under OB procedures tab)  Presume cholestasis due to profound pruritus  Prescribed ursodiol BID, advise steroid course, and would set up for delivery by 37 weeks  Discussed with Dr Edward Hoffmann  Please don't hesitate to contact our office with any concerns or questions    Adriana Childress MD

## 2019-02-19 NOTE — PATIENT INSTRUCTIONS
Thank you for choosing [facility] for your  care today  If you have any questions about your ultrasound or care, please do not hesitate to contact us or your primary obstetrician

## 2019-02-19 NOTE — PROGRESS NOTES
NST procedure and expected outcome explained to patient  Daily fetal kick count discussed with handout given  Patient verbalized understanding of all and was receptive     First dose of steroid given  Nieves Bower RN

## 2019-02-19 NOTE — PROGRESS NOTES
Patient c/o severe intense itching all over whole body, no relief with any lotions, no rash  Bile acids drawn yesterday but pending  Pt states worse at night   (+) good fetal movement, denies any bleeing, fluid leakage or ctx      Problem List Items Addressed This Visit        Musculoskeletal and Integument    Pruritus of pregnancy in third trimester     Bile acids pending, slight elevation of alk phos and albumin  Sent to [facility] for consult regarding early induction if cholestasis            Other    Encounter for supervision of normal pregnancy in third trimester     Vertex presentation  Sent for NST/PATSY due to tachycardia and intense pruritus  RTO 1 wk  GBS done today  rec benedryl to help with itching  Reviewed labor precautions, fetal kick counts and reasons to call           Other Visit Diagnoses     36 weeks gestation of pregnancy    -  Primary    Relevant Orders    Strep B DNA probe, amplification

## 2019-02-20 ENCOUNTER — ROUTINE PRENATAL (OUTPATIENT)
Dept: PERINATAL CARE | Facility: CLINIC | Age: 24
End: 2019-02-20
Payer: COMMERCIAL

## 2019-02-20 VITALS
SYSTOLIC BLOOD PRESSURE: 129 MMHG | BODY MASS INDEX: 25.83 KG/M2 | WEIGHT: 136.8 LBS | DIASTOLIC BLOOD PRESSURE: 77 MMHG | HEART RATE: 101 BPM | HEIGHT: 61 IN

## 2019-02-20 DIAGNOSIS — O26.613 CHOLESTASIS DURING PREGNANCY IN THIRD TRIMESTER: Primary | ICD-10-CM

## 2019-02-20 DIAGNOSIS — K83.1 CHOLESTASIS DURING PREGNANCY IN THIRD TRIMESTER: Primary | ICD-10-CM

## 2019-02-20 DIAGNOSIS — O09.899 SHORT INTERVAL BETWEEN PREGNANCIES COMPLICATING PREGNANCY, ANTEPARTUM: ICD-10-CM

## 2019-02-20 DIAGNOSIS — Z3A.36 36 WEEKS GESTATION OF PREGNANCY: ICD-10-CM

## 2019-02-20 DIAGNOSIS — Z87.59 HISTORY OF PRIOR PREGNANCY WITH IUGR NEWBORN: ICD-10-CM

## 2019-02-20 DIAGNOSIS — Z34.83 ENCOUNTER FOR SUPERVISION OF OTHER NORMAL PREGNANCY IN THIRD TRIMESTER: ICD-10-CM

## 2019-02-20 PROCEDURE — 96372 THER/PROPH/DIAG INJ SC/IM: CPT | Performed by: OBSTETRICS & GYNECOLOGY

## 2019-02-20 RX ADMIN — BETAMETHASONE SODIUM PHOSPHATE AND BETAMETHASONE ACETATE 12 MG: 3; 3 INJECTION, SUSPENSION INTRA-ARTICULAR; INTRALESIONAL; INTRAMUSCULAR at 11:36

## 2019-02-21 ENCOUNTER — TELEPHONE (OUTPATIENT)
Dept: OBGYN CLINIC | Facility: CLINIC | Age: 24
End: 2019-02-21

## 2019-02-21 LAB
BILE AC SERPL-SCNC: 17.1 UMOL/L (ref 4.7–24.5)
GP B STREP DNA SPEC QL NAA+PROBE: NORMAL

## 2019-02-21 NOTE — TELEPHONE ENCOUNTER
Returned pt's call- informed of wnl bile acids  Pt is just wondering if she still needs to have IOL for tomorrow at 8pm    Routed to dr Burt Gasca     Please advise

## 2019-02-22 ENCOUNTER — HOSPITAL ENCOUNTER (INPATIENT)
Facility: HOSPITAL | Age: 24
LOS: 3 days | Discharge: HOME | End: 2019-02-25
Attending: OBSTETRICS & GYNECOLOGY | Admitting: OBSTETRICS & GYNECOLOGY
Payer: COMMERCIAL

## 2019-02-22 ENCOUNTER — HOSPITAL ENCOUNTER (OUTPATIENT)
Dept: LABOR AND DELIVERY | Facility: HOSPITAL | Age: 24
Discharge: HOME | End: 2019-02-22
Payer: COMMERCIAL

## 2019-02-22 DIAGNOSIS — Z3A.36 36 WEEKS GESTATION OF PREGNANCY: Primary | ICD-10-CM

## 2019-02-22 LAB
ABO GROUP BLD: NORMAL
BLD GP AB SCN SERPL QL: NEGATIVE
ERYTHROCYTE [DISTWIDTH] IN BLOOD BY AUTOMATED COUNT: 13.8 % (ref 11.6–15.1)
HCT VFR BLD AUTO: 34.5 % (ref 34.8–46.1)
HGB BLD-MCNC: 11.1 G/DL (ref 11.5–15.4)
MCH RBC QN AUTO: 26.7 PG (ref 26.8–34.3)
MCHC RBC AUTO-ENTMCNC: 32.2 G/DL (ref 31.4–37.4)
MCV RBC AUTO: 83 FL (ref 82–98)
PLATELET # BLD AUTO: 347 THOUSANDS/UL (ref 149–390)
PMV BLD AUTO: 9.7 FL (ref 8.9–12.7)
RBC # BLD AUTO: 4.15 MILLION/UL (ref 3.81–5.12)
RH BLD: POSITIVE
SPECIMEN EXPIRATION DATE: NORMAL
WBC # BLD AUTO: 9.39 THOUSAND/UL (ref 4.31–10.16)

## 2019-02-22 PROCEDURE — 86592 SYPHILIS TEST NON-TREP QUAL: CPT | Performed by: OBSTETRICS & GYNECOLOGY

## 2019-02-22 PROCEDURE — 4A1HXCZ MONITORING OF PRODUCTS OF CONCEPTION, CARDIAC RATE, EXTERNAL APPROACH: ICD-10-PCS | Performed by: OBSTETRICS & GYNECOLOGY

## 2019-02-22 PROCEDURE — 3E0P7VZ INTRODUCTION OF HORMONE INTO FEMALE REPRODUCTIVE, VIA NATURAL OR ARTIFICIAL OPENING: ICD-10-PCS | Performed by: OBSTETRICS & GYNECOLOGY

## 2019-02-22 PROCEDURE — 85027 COMPLETE CBC AUTOMATED: CPT | Performed by: OBSTETRICS & GYNECOLOGY

## 2019-02-22 PROCEDURE — 86900 BLOOD TYPING SEROLOGIC ABO: CPT | Performed by: OBSTETRICS & GYNECOLOGY

## 2019-02-22 PROCEDURE — 86901 BLOOD TYPING SEROLOGIC RH(D): CPT | Performed by: OBSTETRICS & GYNECOLOGY

## 2019-02-22 PROCEDURE — 86850 RBC ANTIBODY SCREEN: CPT | Performed by: OBSTETRICS & GYNECOLOGY

## 2019-02-22 RX ORDER — SODIUM CHLORIDE, SODIUM LACTATE, POTASSIUM CHLORIDE, CALCIUM CHLORIDE 600; 310; 30; 20 MG/100ML; MG/100ML; MG/100ML; MG/100ML
125 INJECTION, SOLUTION INTRAVENOUS CONTINUOUS
Status: DISCONTINUED | OUTPATIENT
Start: 2019-02-22 | End: 2019-02-23

## 2019-02-22 RX ORDER — URSODIOL 300 MG/1
300 CAPSULE ORAL 2 TIMES DAILY
Status: DISCONTINUED | OUTPATIENT
Start: 2019-02-22 | End: 2019-02-23

## 2019-02-22 RX ORDER — BUTORPHANOL TARTRATE 1 MG/ML
1 INJECTION, SOLUTION INTRAMUSCULAR; INTRAVENOUS ONCE
Status: COMPLETED | OUTPATIENT
Start: 2019-02-22 | End: 2019-02-22

## 2019-02-22 RX ORDER — ONDANSETRON 2 MG/ML
4 INJECTION INTRAMUSCULAR; INTRAVENOUS EVERY 6 HOURS PRN
Status: DISCONTINUED | OUTPATIENT
Start: 2019-02-22 | End: 2019-02-23

## 2019-02-22 RX ORDER — OXYTOCIN/RINGER'S LACTATE 30/500 ML
1-30 PLASTIC BAG, INJECTION (ML) INTRAVENOUS
Status: DISCONTINUED | OUTPATIENT
Start: 2019-02-22 | End: 2019-02-22

## 2019-02-22 RX ADMIN — SODIUM CHLORIDE, SODIUM LACTATE, POTASSIUM CHLORIDE, AND CALCIUM CHLORIDE 125 ML/HR: .6; .31; .03; .02 INJECTION, SOLUTION INTRAVENOUS at 21:19

## 2019-02-22 RX ADMIN — MISOPROSTOL 25 MCG: 100 TABLET ORAL at 22:40

## 2019-02-22 RX ADMIN — SODIUM CHLORIDE, SODIUM LACTATE, POTASSIUM CHLORIDE, AND CALCIUM CHLORIDE 125 ML/HR: .6; .31; .03; .02 INJECTION, SOLUTION INTRAVENOUS at 23:38

## 2019-02-22 RX ADMIN — URSODIOL 300 MG: 300 CAPSULE ORAL at 22:42

## 2019-02-22 RX ADMIN — BUTORPHANOL TARTRATE 1 MG: 1 INJECTION, SOLUTION INTRAMUSCULAR; INTRAVENOUS at 22:23

## 2019-02-23 ENCOUNTER — ANESTHESIA EVENT (INPATIENT)
Dept: LABOR AND DELIVERY | Facility: HOSPITAL | Age: 24
End: 2019-02-23
Payer: COMMERCIAL

## 2019-02-23 ENCOUNTER — ANESTHESIA (INPATIENT)
Dept: LABOR AND DELIVERY | Facility: HOSPITAL | Age: 24
End: 2019-02-23
Payer: COMMERCIAL

## 2019-02-23 LAB
BASE EXCESS BLDCOA CALC-SCNC: -1.2 MMOL/L (ref 3–11)
BASE EXCESS BLDCOV CALC-SCNC: -1.1 MMOL/L (ref 1–9)
HCO3 BLDCOA-SCNC: 26.8 MMOL/L (ref 17.3–27.3)
HCO3 BLDCOV-SCNC: 24.8 MMOL/L (ref 12.2–28.6)
O2 CT VFR BLDCOA CALC: 11 ML/DL
OXYHGB MFR BLDCOA: 46.6 %
OXYHGB MFR BLDCOV: 71.5 %
PCO2 BLDCOA: 57.2 MM[HG] (ref 30–60)
PCO2 BLDCOV: 45.6 MM HG (ref 27–43)
PH BLDCOA: 7.29 [PH] (ref 7.23–7.43)
PH BLDCOV: 7.35 [PH] (ref 7.19–7.49)
PO2 BLDCOA: 22 MM HG (ref 5–25)
PO2 BLDCOV: 28.3 MM HG (ref 15–45)
SAO2 % BLDCOV: 16.4 ML/DL

## 2019-02-23 PROCEDURE — 3E033VJ INTRODUCTION OF OTHER HORMONE INTO PERIPHERAL VEIN, PERCUTANEOUS APPROACH: ICD-10-PCS | Performed by: OBSTETRICS & GYNECOLOGY

## 2019-02-23 PROCEDURE — 88307 TISSUE EXAM BY PATHOLOGIST: CPT | Performed by: PATHOLOGY

## 2019-02-23 PROCEDURE — 82805 BLOOD GASES W/O2 SATURATION: CPT | Performed by: OBSTETRICS & GYNECOLOGY

## 2019-02-23 PROCEDURE — 59400 OBSTETRICAL CARE: CPT | Performed by: OBSTETRICS & GYNECOLOGY

## 2019-02-23 PROCEDURE — 10907ZC DRAINAGE OF AMNIOTIC FLUID, THERAPEUTIC FROM PRODUCTS OF CONCEPTION, VIA NATURAL OR ARTIFICIAL OPENING: ICD-10-PCS | Performed by: OBSTETRICS & GYNECOLOGY

## 2019-02-23 RX ORDER — MAGNESIUM HYDROXIDE/ALUMINUM HYDROXICE/SIMETHICONE 120; 1200; 1200 MG/30ML; MG/30ML; MG/30ML
15 SUSPENSION ORAL EVERY 6 HOURS PRN
Status: DISCONTINUED | OUTPATIENT
Start: 2019-02-23 | End: 2019-02-26 | Stop reason: HOSPADM

## 2019-02-23 RX ORDER — ACETAMINOPHEN 325 MG/1
650 TABLET ORAL EVERY 4 HOURS PRN
Status: DISCONTINUED | OUTPATIENT
Start: 2019-02-23 | End: 2019-02-26 | Stop reason: HOSPADM

## 2019-02-23 RX ORDER — LIDOCAINE HYDROCHLORIDE AND EPINEPHRINE 15; 5 MG/ML; UG/ML
INJECTION, SOLUTION EPIDURAL
Status: COMPLETED | OUTPATIENT
Start: 2019-02-23 | End: 2019-02-23

## 2019-02-23 RX ORDER — DOCUSATE SODIUM 100 MG/1
100 CAPSULE, LIQUID FILLED ORAL 2 TIMES DAILY
Status: DISCONTINUED | OUTPATIENT
Start: 2019-02-23 | End: 2019-02-26 | Stop reason: HOSPADM

## 2019-02-23 RX ORDER — OXYCODONE HYDROCHLORIDE AND ACETAMINOPHEN 5; 325 MG/1; MG/1
2 TABLET ORAL EVERY 4 HOURS PRN
Status: DISCONTINUED | OUTPATIENT
Start: 2019-02-23 | End: 2019-02-26 | Stop reason: HOSPADM

## 2019-02-23 RX ORDER — OXYTOCIN/RINGER'S LACTATE 30/500 ML
1-30 PLASTIC BAG, INJECTION (ML) INTRAVENOUS
Status: DISCONTINUED | OUTPATIENT
Start: 2019-02-23 | End: 2019-02-23

## 2019-02-23 RX ORDER — OXYTOCIN/RINGER'S LACTATE 30/500 ML
250 PLASTIC BAG, INJECTION (ML) INTRAVENOUS CONTINUOUS
Status: ACTIVE | OUTPATIENT
Start: 2019-02-23 | End: 2019-02-23

## 2019-02-23 RX ORDER — ACETAMINOPHEN 325 MG/1
650 TABLET ORAL EVERY 6 HOURS PRN
Status: DISCONTINUED | OUTPATIENT
Start: 2019-02-23 | End: 2019-02-23

## 2019-02-23 RX ORDER — DIPHENHYDRAMINE HCL 25 MG
25 TABLET ORAL EVERY 6 HOURS PRN
Status: DISCONTINUED | OUTPATIENT
Start: 2019-02-23 | End: 2019-02-26 | Stop reason: HOSPADM

## 2019-02-23 RX ORDER — IBUPROFEN 600 MG/1
600 TABLET ORAL EVERY 6 HOURS PRN
Status: DISCONTINUED | OUTPATIENT
Start: 2019-02-23 | End: 2019-02-26 | Stop reason: HOSPADM

## 2019-02-23 RX ORDER — ONDANSETRON 2 MG/ML
4 INJECTION INTRAMUSCULAR; INTRAVENOUS EVERY 8 HOURS PRN
Status: DISCONTINUED | OUTPATIENT
Start: 2019-02-23 | End: 2019-02-26 | Stop reason: HOSPADM

## 2019-02-23 RX ORDER — SIMETHICONE 80 MG
80 TABLET,CHEWABLE ORAL 4 TIMES DAILY PRN
Status: DISCONTINUED | OUTPATIENT
Start: 2019-02-23 | End: 2019-02-26 | Stop reason: HOSPADM

## 2019-02-23 RX ORDER — OXYCODONE HYDROCHLORIDE AND ACETAMINOPHEN 5; 325 MG/1; MG/1
1 TABLET ORAL EVERY 4 HOURS PRN
Status: DISCONTINUED | OUTPATIENT
Start: 2019-02-23 | End: 2019-02-26 | Stop reason: HOSPADM

## 2019-02-23 RX ORDER — CALCIUM CARBONATE 200(500)MG
1000 TABLET,CHEWABLE ORAL DAILY PRN
Status: DISCONTINUED | OUTPATIENT
Start: 2019-02-23 | End: 2019-02-26 | Stop reason: HOSPADM

## 2019-02-23 RX ORDER — DIAPER,BRIEF,INFANT-TODD,DISP
1 EACH MISCELLANEOUS AS NEEDED
Status: DISCONTINUED | OUTPATIENT
Start: 2019-02-23 | End: 2019-02-26 | Stop reason: HOSPADM

## 2019-02-23 RX ADMIN — SODIUM CHLORIDE, SODIUM LACTATE, POTASSIUM CHLORIDE, AND CALCIUM CHLORIDE 999 ML/HR: .6; .31; .03; .02 INJECTION, SOLUTION INTRAVENOUS at 06:07

## 2019-02-23 RX ADMIN — ACETAMINOPHEN 650 MG: 325 TABLET, FILM COATED ORAL at 08:32

## 2019-02-23 RX ADMIN — LIDOCAINE HYDROCHLORIDE AND EPINEPHRINE 3 ML: 15; 5 INJECTION, SOLUTION EPIDURAL at 06:25

## 2019-02-23 RX ADMIN — ACETAMINOPHEN 650 MG: 325 TABLET, FILM COATED ORAL at 03:36

## 2019-02-23 RX ADMIN — IBUPROFEN 600 MG: 600 TABLET ORAL at 15:47

## 2019-02-23 RX ADMIN — SODIUM CHLORIDE, SODIUM LACTATE, POTASSIUM CHLORIDE, AND CALCIUM CHLORIDE 125 ML/HR: .6; .31; .03; .02 INJECTION, SOLUTION INTRAVENOUS at 08:49

## 2019-02-23 RX ADMIN — URSODIOL 300 MG: 300 CAPSULE ORAL at 10:03

## 2019-02-23 RX ADMIN — ONDANSETRON 4 MG: 2 INJECTION INTRAMUSCULAR; INTRAVENOUS at 08:35

## 2019-02-23 RX ADMIN — IBUPROFEN 600 MG: 600 TABLET ORAL at 23:50

## 2019-02-23 RX ADMIN — ROPIVACAINE HYDROCHLORIDE: 2 INJECTION, SOLUTION EPIDURAL; INFILTRATION at 06:52

## 2019-02-23 RX ADMIN — OXYCODONE HYDROCHLORIDE AND ACETAMINOPHEN 2 TABLET: 5; 325 TABLET ORAL at 20:02

## 2019-02-23 RX ADMIN — ONDANSETRON 4 MG: 2 INJECTION INTRAMUSCULAR; INTRAVENOUS at 21:42

## 2019-02-23 RX ADMIN — DOCUSATE SODIUM 100 MG: 100 CAPSULE, LIQUID FILLED ORAL at 20:01

## 2019-02-23 RX ADMIN — Medication 2 MILLI-UNITS/MIN: at 03:26

## 2019-02-23 NOTE — OB LABOR/OXYTOCIN SAFETY PROGRESS
Oxytocin Safety Progress Check Note - Светлана Tamayo 24 y o  female MRN: [id number]    Unit/Bed#: -01 Encounter: [id number]    OB History    Para Term  AB Living   3 1 1 0 1 1   SAB TAB Ectopic Multiple Live Births   0 1 0 0 1     Gestational Age: 36w6d  Dose (chelsea-units/min) Oxytocin: 10 chelsea-units/min  Contraction Frequency (minutes): 2-4  Contraction Quality: Mild  Tachysystole: No   Dilation: 1        Effacement (%): 50  Station: -3  Baseline Rate: 130 bpm(to 135)  Fetal Heart Rate: 140 BPM  FHR Category: Category I          Notes/comments:     Late charting secondary to patient care    Due to frequency of contractions, transitioned induction to pitocin titration upon placement in labor room at 0326    2 hour pitocin recheck     Minimal cervical change appreciated  Given difficulty with vaginal exam, recommend epidural and attempt at zarco balloon placement    Koki Monsalve DO 2019 5:45 AM

## 2019-02-23 NOTE — DISCHARGE INSTRUCTIONS
Breast Care for the Breast Feeding Mother   WHAT YOU SHOULD KNOW:   Your breasts will go through normal changes while you are breastfeeding  Sometimes breast and nipple problems can develop while you are breastfeeding  Learn about changes that are normal and those that may be a problem  Breast care can help you prevent and manage problems so you and your baby can enjoy the benefits of breastfeeding  AFTER YOU LEAVE:   Breast changes while you are breastfeeding:   · For the first few days after your baby is born, your body makes a small amount of breast milk (colostrum)  Within about 2 to 5 days, your body will begin making mature milk  It may take up to 10 days or longer for mature milk to come in  When your mature milk comes in, your breasts will become full and firm  They may feel tender  · Breastfeeding your baby will decrease the full feeling in your breasts  You may feel a tingly sensation during feedings as milk is released from your breasts  This is called the milk let-down reflex  After 7 or more days, the fullness may feel like it has decreased  Your nipples should look the same as they did before you started breastfeeding  Breasts that feel full before and empty after breastfeeding are signs that breastfeeding is going well  Breast problems that can occur while you are breastfeeding:   · Nipple soreness  may occur when you begin to breastfeed your baby  You may also have nipple soreness if your baby is not latched on to your breast correctly  Correct positioning and latch-on may decrease or stop the pain in your nipples  Work with your caregivers to help your baby latch on correctly  It may also be helpful to place warm, wet compresses on your nipples to help decrease pain  · Plugged milk ducts  may cause painful breast lumps  Plugged ducts may be caused by not emptying your breasts completely during feedings   When your baby pauses during breastfeeding, massage and gently squeeze your breast  Gentle massage may unplug a blocked milk duct  Pump out any milk left in your breasts after your baby is done breastfeeding  Avoid wearing tight tops, tight bras, or under-wire bras, because they may put pressure on your breasts  · Engorgement  may occur as your milk comes in soon after you begin breastfeeding  Engorgement may cause your breasts to become swollen and painful  Your breasts may also become engorged if you miss a feeding or you do not breastfeed on demand  The best way to decrease engorgement symptoms is to empty your breasts by feeding your baby often  Engorgement can make it hard for your baby to latch on to your breast  If this happens, express a small amount of milk and then have your baby latch on  Cold compresses, gel packs, or ice packs on your breasts can help decrease pain and swelling  Ask your caregiver how often and how long you should use cold, or ice packs  · A breast infection called mastitis  can develop if you have plugged milk ducts or engorgement  Mastitis causes your breasts to become red, swollen, and painful  You may also have flu-like symptoms, such as chills and a fever  Place heat on your breasts to help decrease the pain  You may want to place a moist, warm cloth on the painful breast or both of your breasts  Ask how often to do this  Your primary healthcare provider [facility]) may suggest that you take an NSAID, such as ibuprofen, to decrease pain and swelling  He may also order antibiotics to treat mastitis  Ask about feeding your baby when you have a breast infection  How to help prevent or manage breast problems while you are breastfeeding:   · Learn how to position your baby and latch him on correctly  To latch your baby correctly to your breast, make sure that his mouth covers most of your areola (dark area around your nipple)  He should not be attached only to the nipple  Your baby is latched on well if you feel comfortable and do not feel pain   A correct latch helps him get enough milk and can help to prevent sore nipples and other breast problems  There are several breastfeeding positions that you can try  Find the position that works best for you and your baby  Ask your caregiver for more information about how to hold and breastfeed your baby  · Prevent biting  Your baby may get teeth at about 1to 3months of age  To help prevent biting, break his suction once he is finished or if he has fallen asleep  To break his suction, slip a finger into the side of his mouth  If your baby bites you, respond with surprise or unhappiness  Offer praise when he does not bite you  · Breastfeed your baby regularly  Feed your baby 8 to 12 times a day  You may need to wake up your baby at night to feed him  It is okay to feed from 1 or both breasts at each feeding  Your baby should breastfeed from both breasts equally over the course of a day  If your baby only feeds from 1 side during a feeding, offer your other breast to him first for the next feeding  · Schedule and keep follow-up visits  Talk to your baby's pediatrician or your PHP during follow-up visits if you have breast problems  Caregivers may suggest that you, or you and your partner, attend classes on breastfeeding  You also may want to join a breastfeeding support group  Caregivers may suggest that you see a lactation consultant  This is a caregiver who can help you with breastfeeding  Contact your PHP if:   · You have a fever and chills  · You have body aches and you feel like you do not have any energy  · One or both of your breasts is red, swollen or hard, painful, and feels warm or hot  · You have breast engorgement that does not get better within 24 hours  · You see or feel a lump in your breast that hurts when you touch it  · You have nipple pain during breastfeeding or between feedings  · Your nipples are red, dry, cracked, or bleeding, or they have scabs on them       · You have questions or concerns about your condition or care  © 2014 [address] is for End User's use only and may not be sold, redistributed or otherwise used for commercial purposes  All illustrations and images included in CareNotes® are the copyrighted property of [employer]  or Jose Luis Ledezma  The above information is an  only  It is not intended as medical advice for individual conditions or treatments  Talk to your doctor, nurse or pharmacist before following any medical regimen to see if it is safe and effective for you  Postpartum Perineal Care   WHAT YOU NEED TO KNOW:   Postpartum perineal care is care for your perineum after you have a baby  The perineum is your vagina and anus  DISCHARGE INSTRUCTIONS:   Care for your perineum:  Healthcare providers will give you a small squirt bottle and show you how to use it  Do the following after you use the toilet and before you put on a new pad:  · Remove the soiled pad    · Use the squirt bottle to rinse your perineum from front to back while you sit on the toilet     · Pat the area dry from front to back with toilet paper or a cotton cloth     · Put on a fresh pad    · Wash your hands  Decrease pain:  Ask your healthcare provider about these and other ways to decrease perineal pain:  · Sitz baths:  Healthcare providers may give you a portable sitz bath  This is a small tub that fits in the toilet  Fill the sitz bath or bathtub with 4 to 6 inches of warm water  Sit in the warm water for 20 minutes 2 to 3 times a day  · Ice:  Ice helps decrease swelling and pain  Ice may also help prevent tissue damage  Use an ice pack, or put crushed ice in a plastic bag  Cover it with a towel and place it on your perineum for 15 to 20 minutes every hour, or as directed  · Medicine spray, wipes, or pads:  Healthcare providers may give you a medicine spray or wipes soaked with numbing medicine to decrease the pain   Pads that contain an herb called witch hazel may also help reduce pain  Use these after perineal care or a sitz bath  Follow up with your healthcare provider as directed:  Write down your questions so you remember to ask them during your visits  Contact your healthcare provider if:   · You have heavy vaginal bleeding that fills 1 or more sanitary pads in 1 hour  · You have foul-smelling vaginal discharge  · You feel weak or lightheaded  · You have questions or concerns about your condition or care  Seek care immediately or call 911 if:   · You have large blood clots or bright red blood coming from your vagina  · You have abdominal pain, vomiting, and a fever  © 2017 [address] [facility] Information is for End User's use only and may not be sold, redistributed or otherwise used for commercial purposes  All illustrations and images included in CareNotes® are the copyrighted property of [employer]  or Jose Luis Ledezma  The above information is an  only  It is not intended as medical advice for individual conditions or treatments  Talk to your doctor, nurse or pharmacist before following any medical regimen to see if it is safe and effective for you

## 2019-02-23 NOTE — DISCHARGE SUMMARY
Discharge Summary - Ana Maria Stringer 24 y o  female MRN: [id number]    Unit/Bed#: -01 Encounter: [id number]    Admission Date: 2019     Discharge Date: 19    Admitting Attending: Dr Johanna Coombs  Delivering Attending: Dr Johanna Coombs  Discharge Attending: SANDRA Castle    Diagnosis:   1  Pregnancy at 36w6d   2  Intrahepatic cholestasis of pregnancy  3  Short interval pregnancy    Procedures: Spontaneous Vaginal Delivery    Complications: none apparent    Hospital Course: Ms Ana Maria Stringer is a 24 y o   at 40wk8d  She presented to labor and delivery for induction of labor for cholestasis of pregnancy, currently on ursodiol  She was given a full course of betamethasone for indicated  delivery prior to induction of labor  She was given one dose of cytotec for cervical ripening followed by pitocin titration  She received an epidural for pain control and was artificially ruptured  She delivered via spontaneous vaginal delivery with no complications and no lacerations  She had an uncomplicated postpartum course  Condition at discharge: good     On day of discharge, patient was tolerating PO, passing flatus, urinating, and ambulating  Her pain was well controlled with oral analgesics  She was discharged home on postpartum day #2 with standard post partum instructions to follow up with her physician in 3-6 weeks for a postpartum appointment  Discharge instructions/Information to patient and family:   - Do not place anything (no partner, tampons or douche) in your vagina for 6 weeks    -You may walk for exercise for the first 6 weeks then gradually return to your usual activities    -Please do not drive for 1 week if you have no stitches and for 2 weeks if you have stitches or underwent a  delivery     -You may take baths or shower per your preference    -Please look at your bust (breasts) in the mirror daily and call for redness or tenderness or increased warmth    -Please call us for temperature > 100 4*F or 38* C, worsening pain or a foul discharge       Discharge Medications:   Prenatal vitamin daily for 6 months or the duration of nursing whichever is longer  Motrin 600 mg orally every 6 hours as needed for pain  Tylenol (over the counter) per bottle directions as needed for pain: do NOT use with percocet  Hydrocortisone cream 1% (over the counter) applied 1-2x daily to hemorrhoids as needed    Provisions for Follow-Up Care: Follow up with your doctor in 6 weeks for postpartum care       Planned Readmission: Alexus Lin MD

## 2019-02-23 NOTE — L&D DELIVERY NOTE
Vaginal Delivery Summary - OB/GYN   Geronimo Loza 24 y o  female MRN: [id number]  Unit/Bed#: -01 Encounter: [id number]          Predelivery Diagnosis:  1  Pregnancy at 36w6d   2  Intrahepatic cholestasis of pregnancy  3  Short interval pregnancy    Postdelivery Diagnosis:  1  Same as above  2  Delivery of      Procedure: Spontaneous Vaginal Delivery, no lacerations    Attending: Dr Mervat Gonzalez    Assistant: Kayla Lucero    Anesthesia: Epidural    QBL: 77cc  Admission H 1  Admission platelets: 431    Complications: none apparent    Specimens: cord blood, arterial and venous cord blood gasses, placenta to pathology    Findings:   1  Viable female at 1416, with APGARS of 8 and 9 at 1 and 5 minutes respectively,  2  Spontaneous delivery of intact placenta at 1418  3  No lacerations  4  Blood gases:   Arterial pH: 7 288   Arterial base excess: -1 2   Venous pH: 7 354   Venous base excess: -1 1    Disposition:  Patient tolerated the procedure well and was recovering in labor and delivery room     Brief history and labor course:  Ms Geronimo Loza is a 24 y o   at [address]  She presented to labor and delivery for induction of labor for cholestasis of pregnancy, currently on ursodiol  She was given a full course of betamethasone for indicated  delivery prior to induction of labor  She was given one dose of cytotec for cervical ripening followed by pitocin titration  She received an epidural for pain control  She was artificially ruptured and became complete at 1405  She started pushing at 1415  Description of procedure    After pushing for 1 minutes, at 1416 patient delivered a viable female , wt pending, apgars of 8 (1 min) and 9 (5 min)  The fetal vertex delivered spontaneously  Baby was checked for nuchal  There was none present  The anterior shoulder delivered atraumatically with maternal expulsive forces and the assistance of downward traction   The posterior shoulder delivered with maternal expulsive forces and the assistance of upward traction  The remainder of the fetus delivered spontaneously  Upon delivery, the infant was placed on the mothers abdomen and the cord was clamped and cut  Delayed cord clamping was performed  The infant was noted to cry spontaneously and was moving all extremities appropriately  There was no evidence for injury  Awaiting nurse resuscitators evaluated the   Arterial and venous cord blood gases and cord blood was collected for analysis  These were promptly sent to the lab  In the immediate post-partum, 30 units of IV pitocin was administered, and the uterus was noted to contract down well with massage and pitocin  The placenta delivered spontaneously at 1418 and was noted to have a centrally inserted 3 vessel cord  The vagina, cervix, perineum, and rectum were inspected and there was noted to be no lacerations  At the conclusion of the procedure, all needle, sponge, and instrument counts were noted to be correct  Patient tolerated the procedure well and was allowed to recover in labor and delivery room with family and  before being transferred to the post-partum floor  Dr Alonso Malagon was present and participated in all key portions of the case        Sampson Shea MD  2019  3:10 PM

## 2019-02-23 NOTE — ADDENDUM NOTE
Addendum  created 02/23/19 1526 by Amisha Lee MD    Intraprocedure Event edited, Intraprocedure Staff edited

## 2019-02-23 NOTE — OB LABOR/OXYTOCIN SAFETY PROGRESS
Oxytocin Safety Progress Check Note - Army Steele 24 y o  female MRN: [id number]    Unit/Bed#: LD Triage  Encounter: [id number]    OB History    Para Term  AB Living   3 1 1 0 1 1   SAB TAB Ectopic Multiple Live Births   0 1 0 0 1     Gestational Age: 44w9d               Dilation: Fingertip        Effacement (%): 50  Station: -3     Fetal Heart Rate: 155 BPM             Notes/comments:   Foleybulb attempted, patient overly uncomfortable  Cytotec placed with success    Recheck 3-4 hours           Bee Jarrett DO 2019 10:43 PM

## 2019-02-23 NOTE — ANESTHESIA PROCEDURE NOTES
Epidural Block    Patient location during procedure: OB  Start time: 2/23/2019 6:25 AM  Reason for block: procedure for pain, at surgeon's request and primary anesthetic  Staffing  Anesthesiologist: Malu Clifton MD  Performed: anesthesiologist   Preanesthetic Checklist  Completed: patient identified, site marked, surgical consent, pre-op evaluation, timeout performed, IV checked, risks and benefits discussed and monitors and equipment checked  Epidural  Patient position: sitting  Prep: Betadine  Patient monitoring: heart rate, cardiac monitor, continuous pulse ox and frequent blood pressure checks  Approach: midline  Location: lumbar (1-5)  Injection technique: DANIELA air  Needle  Needle type: Tuohy   Needle gauge: 18 G  Test dose: negativelidocaine 1 5% with epinephrine 1:200,000 test dose, 3 mL  Assessment  Sensory level: [id number] aspiration for CSF, negative aspiration for heme and no paresthesia on injection  patient tolerated the procedure well with no immediate complications  Additional Notes  One attempt, L3-4, DANIELA at 5 cm, taped to skin at 12 cm

## 2019-02-23 NOTE — OB LABOR/OXYTOCIN SAFETY PROGRESS
Oxytocin Safety Progress Check Note - Arvind Rust 24 y o  female MRN: [id number]    Unit/Bed#: -01 Encounter: [id number]    OB History    Para Term  AB Living   3 1 1 0 1 1   SAB TAB Ectopic Multiple Live Births   0 1 0 0 1     Gestational Age: 36w6d  Dose ([place]sea-units/min) Oxytocin: 14 chelsea-units/min  Contraction Frequency (minutes): 2-4  Contraction Quality: Mild  Tachysystole: No   Dilation: 3        Effacement (%): 50  Station: -2  Baseline Rate: 130 bpm  Fetal Heart Rate: 132 BPM  FHR Category: Category I          Notes/comments:     Pitocin: 14 mU/min  SVE: 3/50/-2  FHT: CAT I  [place]: q 2-5 minutes  Analgesia: Epidural in place  Plan: Continue pitocin titration  Will rupture at next check       D/w Dr Sanju Schmitt MD 2019 9:26 AM

## 2019-02-23 NOTE — OB LABOR/OXYTOCIN SAFETY PROGRESS
Oxytocin Safety Progress Check Note - Jai Zavala 24 y o  female MRN: [id number]    Unit/Bed#: -01 Encounter: [id number]    OB History    Para Term  AB Living   3 1 1 0 1 1   SAB TAB Ectopic Multiple Live Births   0 1 0 0 1     Gestational Age: 36w6d  Dose ([place]-units/min) Oxytocin: 16 chel[place]-units/min  Contraction Frequency (minutes): 1-7  Contraction Quality: Mild, Moderate  Tachysystole: No   Dilation: 3-4        Effacement (%): 50  Station: -2  Baseline Rate: 135 bpm  Fetal Heart Rate: 132 BPM  FHR Category: Category I          Notes/comments:     Pitocin: 16 mU/min  SVE: 3-4/50/-2  FHT: CAT I  [place]: q 1-2 minutes  Analgesia: epidural  Plan: AROM'd for clear, minimal fluids  Continue pitocin titration               Pastor Alycia MD 2019 11:26 AM
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
## 2019-02-23 NOTE — ANESTHESIA PREPROCEDURE EVALUATION
Review of Systems/Medical History          Cardiovascular   Pulmonary  Asthma ,        GI/Hepatic            Endo/Other     GYN  Currently pregnant ,          Hematology   Musculoskeletal       Neurology   Psychology   Anxiety,                   Anesthesia Plan  ASA Score- 2     Anesthesia Type- epidural with ASA Monitors  Additional Monitors:   Airway Plan:         Plan Factors-    Induction-     Postoperative Plan-     Informed Consent- Anesthetic plan and risks discussed with patient

## 2019-02-23 NOTE — PLAN OF CARE
Problem: BIRTH - VAGINAL/ SECTION  Goal: Fetal and maternal status remain reassuring during the birth process  Description  INTERVENTIONS:  - Monitor vital signs  - Monitor fetal heart rate  - Monitor uterine activity  - Monitor labor progression (vaginal delivery)  - DVT prophylaxis  - Antibiotic prophylaxis  Outcome: Progressing  Goal: Emotionally satisfying birthing experience for mother/fetus  Description  Interventions:  - Assess, plan, implement and evaluate the nursing care given to the patient in labor  - Advocate the philosophy that each childbirth experience is a unique experience and support the family's chosen level of involvement and control during the labor process   - Actively participate in both the patient's and family's teaching of the birth process  - Consider cultural, Mu-ism and age-specific factors and plan care for the patient in labor  Outcome: Progressing     Problem: PAIN - ADULT  Goal: Verbalizes/displays adequate comfort level or baseline comfort level  Description  Interventions:  - Encourage patient to monitor pain and request assistance  - Assess pain using appropriate pain scale  - Administer analgesics based on type and severity of pain and evaluate response  - Implement non-pharmacological measures as appropriate and evaluate response  - Consider cultural and social influences on pain and pain management  - Notify physician/advanced practitioner if interventions unsuccessful or patient reports new pain  Outcome: Progressing     Problem: INFECTION - ADULT  Goal: Absence or prevention of progression during hospitalization  Description  INTERVENTIONS:  - Assess and monitor for signs and symptoms of infection  - Monitor lab/diagnostic results  - Monitor all insertion sites, i e  indwelling lines, tubes, and drains  - Monitor endotracheal (as able) and nasal secretions for changes in amount and color  - [place] appropriate cooling/warming therapies per order  - Administer medications as ordered  - Instruct and encourage patient and family to use good hand hygiene technique  - Identify and instruct in appropriate isolation precautions for identified infection/condition  Outcome: Progressing     Problem: SAFETY ADULT  Goal: Patient will remain free of falls  Description  INTERVENTIONS:  - Assess patient frequently for physical needs  -  Identify cognitive and physical deficits and behaviors that affect risk of falls    -  [place] fall precautions as indicated by assessment   - Educate patient/family on patient safety including physical limitations  - Instruct patient to call for assistance with activity based on assessment  - Modify environment to reduce risk of injury  - Consider OT/PT consult to assist with strengthening/mobility  Outcome: Progressing  Goal: Maintain or return to baseline ADL function  Description  INTERVENTIONS:  -  Assess patient's ability to carry out ADLs; assess patient's baseline for ADL function and identify physical deficits which impact ability to perform ADLs (bathing, care of mouth/teeth, toileting, grooming, dressing, etc )  - Assess/evaluate cause of self-care deficits   - Assess range of motion  - Assess patient's mobility; develop plan if impaired  - Assess patient's need for assistive devices and provide as appropriate  - Encourage maximum independence but intervene and supervise when necessary  ¯ Involve family in performance of ADLs  ¯ Assess for home care needs following discharge   ¯ Request OT consult to assist with ADL evaluation and planning for discharge  ¯ Provide patient education as appropriate  Outcome: Progressing  Goal: Maintain or return mobility status to optimal level  Description  INTERVENTIONS:  - Assess patient's baseline mobility status (ambulation, transfers, stairs, etc )    - Identify cognitive and physical deficits and behaviors that affect mobility  - Identify mobility aids required to assist with transfers and/or ambulation (gait belt, sit-to-stand, lift, walker, cane, etc )  - [place] fall precautions as indicated by assessment  - Record patient progress and toleration of activity level on Mobility SBAR; progress patient to next Phase/Stage  - Instruct patient to call for assistance with activity based on assessment  - Request Rehabilitation consult to assist with strengthening/weightbearing, etc   Outcome: Progressing     Problem: Knowledge Deficit  Goal: Patient/family/caregiver demonstrates understanding of disease process, treatment plan, medications, and discharge instructions  Description  Complete learning assessment and assess knowledge base    Interventions:  - Provide teaching at level of understanding  - Provide teaching via preferred learning methods  Outcome: Progressing     Problem: DISCHARGE PLANNING  Goal: Discharge to home or other facility with appropriate resources  Description  INTERVENTIONS:  - Identify barriers to discharge w/patient and caregiver  - Arrange for needed discharge resources and transportation as appropriate  - Identify discharge learning needs (meds, wound care, etc )  - Arrange for interpretive services to assist at discharge as needed  - Refer to Case Management Department for coordinating discharge planning if the patient needs post-hospital services based on physician/advanced practitioner order or complex needs related to functional status, cognitive ability, or social support system  Outcome: Progressing

## 2019-02-23 NOTE — ANESTHESIA POSTPROCEDURE EVALUATION
Post-Op Assessment Note    CV Status:  Stable    Pain management: adequate     Hydration Status:  Stable   PONV Controlled:  None   Airway Patency:  Patent   Post Op Vitals Reviewed: Yes      Staff: Anesthesiologist     Post-op block assessment: catheter intact        BP      Temp      Pulse     Resp      SpO2

## 2019-02-23 NOTE — OB LABOR/OXYTOCIN SAFETY PROGRESS
Oxytocin Safety Progress Check Note - Светлана Tamayo 24 y o  female MRN: [id number]    Unit/Bed#: -01 Encounter: [id number]    OB History    Para Term  AB Living   3 1 1 0 1 1   SAB TAB Ectopic Multiple Live Births   0 1 0 0 1     Gestational Age: 36w6d  Dose (chelsea-units/min) Oxytocin: 16 chelsea-units/min  Contraction Frequency (minutes): 1-3  Contraction Quality: Mild, Moderate  Tachysystole: No   Dilation: 5        Effacement (%): 70  Station: 0  Baseline Rate: 140 bpm  Fetal Heart Rate: 132 BPM  FHR Category: Category I          Notes/comments:      Patient doing well, has been resting  Cervical change noted  FHT cat 1  Continue pitocin          Debra Boss MD 2019 1:37 PM

## 2019-02-24 PROCEDURE — 99024 POSTOP FOLLOW-UP VISIT: CPT | Performed by: OBSTETRICS & GYNECOLOGY

## 2019-02-24 RX ORDER — URSODIOL 300 MG/1
300 CAPSULE ORAL 2 TIMES DAILY
Status: DISCONTINUED | OUTPATIENT
Start: 2019-02-24 | End: 2019-02-26 | Stop reason: HOSPADM

## 2019-02-24 RX ORDER — URSODIOL 300 MG/1
300 CAPSULE ORAL 2 TIMES DAILY
Status: DISCONTINUED | OUTPATIENT
Start: 2019-02-24 | End: 2019-02-24

## 2019-02-24 RX ADMIN — HYDROCORTISONE 1 APPLICATION: 1 CREAM TOPICAL at 02:42

## 2019-02-24 RX ADMIN — ACETAMINOPHEN 650 MG: 325 TABLET, FILM COATED ORAL at 20:03

## 2019-02-24 RX ADMIN — DOCUSATE SODIUM 100 MG: 100 CAPSULE, LIQUID FILLED ORAL at 18:08

## 2019-02-24 RX ADMIN — DOCUSATE SODIUM 100 MG: 100 CAPSULE, LIQUID FILLED ORAL at 08:39

## 2019-02-24 RX ADMIN — URSODIOL 300 MG: 300 CAPSULE ORAL at 08:39

## 2019-02-24 RX ADMIN — URSODIOL 300 MG: 300 CAPSULE ORAL at 18:10

## 2019-02-24 RX ADMIN — IBUPROFEN 600 MG: 600 TABLET ORAL at 08:39

## 2019-02-24 RX ADMIN — ACETAMINOPHEN 650 MG: 325 TABLET, FILM COATED ORAL at 13:17

## 2019-02-24 RX ADMIN — IBUPROFEN 600 MG: 600 TABLET ORAL at 18:07

## 2019-02-24 RX ADMIN — URSODIOL 300 MG: 300 CAPSULE ORAL at 02:39

## 2019-02-24 NOTE — PLAN OF CARE
Problem: PAIN - ADULT  Goal: Verbalizes/displays adequate comfort level or baseline comfort level  Description  Interventions:  - Encourage patient to monitor pain and request assistance  - Assess pain using appropriate pain scale  - Administer analgesics based on type and severity of pain and evaluate response  - Implement non-pharmacological measures as appropriate and evaluate response  - Consider cultural and social influences on pain and pain management  - Notify physician/advanced practitioner if interventions unsuccessful or patient reports new pain  Outcome: Progressing     Problem: INFECTION - ADULT  Goal: Absence or prevention of progression during hospitalization  Description  INTERVENTIONS:  - Assess and monitor for signs and symptoms of infection  - Monitor lab/diagnostic results  - Monitor all insertion sites, i e  indwelling lines, tubes, and drains  - Monitor endotracheal (as able) and nasal secretions for changes in amount and color  - [place] appropriate cooling/warming therapies per order  - Administer medications as ordered  - Instruct and encourage patient and family to use good hand hygiene technique  - Identify and instruct in appropriate isolation precautions for identified infection/condition  Outcome: Progressing     Problem: SAFETY ADULT  Goal: Patient will remain free of falls  Description  INTERVENTIONS:  - Assess patient frequently for physical needs  -  Identify cognitive and physical deficits and behaviors that affect risk of falls    -  [place] fall precautions as indicated by assessment   - Educate patient/family on patient safety including physical limitations  - Instruct patient to call for assistance with activity based on assessment  - Modify environment to reduce risk of injury  - Consider OT/PT consult to assist with strengthening/mobility  Outcome: Progressing  Goal: Maintain or return to baseline ADL function  Description  INTERVENTIONS:  -  Assess patient's ability to carry out ADLs; assess patient's baseline for ADL function and identify physical deficits which impact ability to perform ADLs (bathing, care of mouth/teeth, toileting, grooming, dressing, etc )  - Assess/evaluate cause of self-care deficits   - Assess range of motion  - Assess patient's mobility; develop plan if impaired  - Assess patient's need for assistive devices and provide as appropriate  - Encourage maximum independence but intervene and supervise when necessary  ¯ Involve family in performance of ADLs  ¯ Assess for home care needs following discharge   ¯ Request OT consult to assist with ADL evaluation and planning for discharge  ¯ Provide patient education as appropriate  Outcome: Progressing  Goal: Maintain or return mobility status to optimal level  Description  INTERVENTIONS:  - Assess patient's baseline mobility status (ambulation, transfers, stairs, etc )    - Identify cognitive and physical deficits and behaviors that affect mobility  - Identify mobility aids required to assist with transfers and/or ambulation (gait belt, sit-to-stand, lift, walker, cane, etc )  - [place] fall precautions as indicated by assessment  - Record patient progress and toleration of activity level on Mobility SBAR; progress patient to next Phase/Stage  - Instruct patient to call for assistance with activity based on assessment  - Request Rehabilitation consult to assist with strengthening/weightbearing, etc   Outcome: Progressing     Problem: Knowledge Deficit  Goal: Patient/family/caregiver demonstrates understanding of disease process, treatment plan, medications, and discharge instructions  Description  Complete learning assessment and assess knowledge base    Interventions:  - Provide teaching at level of understanding  - Provide teaching via preferred learning methods  Outcome: Progressing     Problem: DISCHARGE PLANNING  Goal: Discharge to home or other facility with appropriate resources  Description  INTERVENTIONS:  - Identify barriers to discharge w/patient and caregiver  - Arrange for needed discharge resources and transportation as appropriate  - Identify discharge learning needs (meds, wound care, etc )  - Arrange for interpretive services to assist at discharge as needed  - Refer to Case Management Department for coordinating discharge planning if the patient needs post-hospital services based on physician/advanced practitioner order or complex needs related to functional status, cognitive ability, or social support system  Outcome: Progressing     Problem: ALTERATION IN THE BREAST  Goal: Optimize infant feeding at the breast  Description  INTERVENTIONS:  - Latch, breast and nipple assessment  - Assess prior breast feeding history  - Hand expression of breast milk  - For cracked, bleeding and or sore nipples reassess latch, treat damaged nipple  -Educate mother on feeding cues  -Positioning/latch techniques  Outcome: Progressing     Problem: INADEQUATE LATCH, SUCK OR SWALLOW  Goal: Demonstrate ability to latch and sustain latch, audible swallowing and satiety  Description  INTERVENTIONS:  - Assess oral anatomy, notify Physician/AP for abnormal findings  - Establish milk expression  - Maximize feeding opportunity (skin to skin, behavioral state)  - Position/latch techniques  - Discourage use of pacifier-artificial nipple  - Mechanical pumping  - Nipple Shield  - Supplemental formula feeding (Physician/AP order)  - Alternative feeding method  Outcome: Progressing     Problem: POSTPARTUM  Goal: Experiences normal postpartum course  Description  INTERVENTIONS:  - Monitor maternal vital signs  - Assess uterine involution and lochia  Outcome: Progressing  Goal: Appropriate maternal -  bonding  Description  INTERVENTIONS:  - Identify family support  - Assess for appropriate maternal/infant bonding   -Encourage maternal/infant bonding opportunities  - Referral to  or case manager as needed  Outcome: Progressing  Goal: Establishment of infant feeding pattern  Description  INTERVENTIONS:  - Assess breast/bottle feeding  - Refer to lactation as needed  Outcome: Progressing  Goal: Incision(s), wounds(s) or drain site(s) healing without S/S of infection  Description  INTERVENTIONS  - Assess and document risk factors for skin impairment   - Assess and document dressing, incision, wound bed, drain sites and surrounding tissue  - Initiate Nutrition services consult and/or wound management as needed  Outcome: Progressing

## 2019-02-24 NOTE — NURSING NOTE
Pt at bedside hunched over dry heaving  Pt states she forgot that the last time she took percocet for back pain she had this same reaction  Pt requested medication for nausea at the moment  Pts mother in law was a medical emergency in the hospital and is now in ICU  Pt  And pts  requesting, if possible, to bring the  up to ICU for her to meet the  before she has to get surgery or before her condition worsens  Pt states that her mother in law suffered a ruptured brain aneurysm  ICU notified of this request, spoke with Baldomero Oleary, and was told that newborns are not allowed in ICU

## 2019-02-24 NOTE — PLAN OF CARE
Problem: PAIN - ADULT  Goal: Verbalizes/displays adequate comfort level or baseline comfort level  Description  Interventions:  - Encourage patient to monitor pain and request assistance  - Assess pain using appropriate pain scale  - Administer analgesics based on type and severity of pain and evaluate response  - Implement non-pharmacological measures as appropriate and evaluate response  - Consider cultural and social influences on pain and pain management  - Notify physician/advanced practitioner if interventions unsuccessful or patient reports new pain  Outcome: Progressing     Problem: INFECTION - ADULT  Goal: Absence or prevention of progression during hospitalization  Description  INTERVENTIONS:  - Assess and monitor for signs and symptoms of infection  - Monitor lab/diagnostic results  - Monitor all insertion sites, i e  indwelling lines, tubes, and drains  - Monitor endotracheal (as able) and nasal secretions for changes in amount and color  - [place] appropriate cooling/warming therapies per order  - Administer medications as ordered  - Instruct and encourage patient and family to use good hand hygiene technique  - Identify and instruct in appropriate isolation precautions for identified infection/condition  Outcome: Progressing     Problem: SAFETY ADULT  Goal: Patient will remain free of falls  Description  INTERVENTIONS:  - Assess patient frequently for physical needs  -  Identify cognitive and physical deficits and behaviors that affect risk of falls    -  [place] fall precautions as indicated by assessment   - Educate patient/family on patient safety including physical limitations  - Instruct patient to call for assistance with activity based on assessment  - Modify environment to reduce risk of injury  - Consider OT/PT consult to assist with strengthening/mobility  Outcome: Progressing  Goal: Maintain or return to baseline ADL function  Description  INTERVENTIONS:  -  Assess patient's ability to carry out ADLs; assess patient's baseline for ADL function and identify physical deficits which impact ability to perform ADLs (bathing, care of mouth/teeth, toileting, grooming, dressing, etc )  - Assess/evaluate cause of self-care deficits   - Assess range of motion  - Assess patient's mobility; develop plan if impaired  - Assess patient's need for assistive devices and provide as appropriate  - Encourage maximum independence but intervene and supervise when necessary  ¯ Involve family in performance of ADLs  ¯ Assess for home care needs following discharge   ¯ Request OT consult to assist with ADL evaluation and planning for discharge  ¯ Provide patient education as appropriate  Outcome: Progressing  Goal: Maintain or return mobility status to optimal level  Description  INTERVENTIONS:  - Assess patient's baseline mobility status (ambulation, transfers, stairs, etc )    - Identify cognitive and physical deficits and behaviors that affect mobility  - Identify mobility aids required to assist with transfers and/or ambulation (gait belt, sit-to-stand, lift, walker, cane, etc )  - [place] fall precautions as indicated by assessment  - Record patient progress and toleration of activity level on Mobility SBAR; progress patient to next Phase/Stage  - Instruct patient to call for assistance with activity based on assessment  - Request Rehabilitation consult to assist with strengthening/weightbearing, etc   Outcome: Progressing     Problem: Knowledge Deficit  Goal: Patient/family/caregiver demonstrates understanding of disease process, treatment plan, medications, and discharge instructions  Description  Complete learning assessment and assess knowledge base    Interventions:  - Provide teaching at level of understanding  - Provide teaching via preferred learning methods  Outcome: Progressing     Problem: DISCHARGE PLANNING  Goal: Discharge to home or other facility with appropriate resources  Description  INTERVENTIONS:  - Identify barriers to discharge w/patient and caregiver  - Arrange for needed discharge resources and transportation as appropriate  - Identify discharge learning needs (meds, wound care, etc )  - Arrange for interpretive services to assist at discharge as needed  - Refer to Case Management Department for coordinating discharge planning if the patient needs post-hospital services based on physician/advanced practitioner order or complex needs related to functional status, cognitive ability, or social support system  Outcome: Progressing     Problem: ALTERATION IN THE BREAST  Goal: Optimize infant feeding at the breast  Description  INTERVENTIONS:  - Latch, breast and nipple assessment  - Assess prior breast feeding history  - Hand expression of breast milk  - For cracked, bleeding and or sore nipples reassess latch, treat damaged nipple  -Educate mother on feeding cues  -Positioning/latch techniques  Outcome: Progressing     Problem: INADEQUATE LATCH, SUCK OR SWALLOW  Goal: Demonstrate ability to latch and sustain latch, audible swallowing and satiety  Description  INTERVENTIONS:  - Assess oral anatomy, notify Physician/AP for abnormal findings  - Establish milk expression  - Maximize feeding opportunity (skin to skin, behavioral state)  - Position/latch techniques  - Discourage use of pacifier-artificial nipple  - Mechanical pumping  - Nipple Shield  - Supplemental formula feeding (Physician/AP order)  - Alternative feeding method  Outcome: Progressing     Problem: POSTPARTUM  Goal: Experiences normal postpartum course  Description  INTERVENTIONS:  - Monitor maternal vital signs  - Assess uterine involution and lochia  Outcome: Progressing  Goal: Appropriate maternal -  bonding  Description  INTERVENTIONS:  - Identify family support  - Assess for appropriate maternal/infant bonding   -Encourage maternal/infant bonding opportunities  - Referral to  or case manager as needed  Outcome: Progressing  Goal: Establishment of infant feeding pattern  Description  INTERVENTIONS:  - Assess breast/bottle feeding  - Refer to lactation as needed  Outcome: Progressing  Goal: Incision(s), wounds(s) or drain site(s) healing without S/S of infection  Description  INTERVENTIONS  - Assess and document risk factors for skin impairment   - Assess and document dressing, incision, wound bed, drain sites and surrounding tissue  - Initiate Nutrition services consult and/or wound management as needed  Outcome: Progressing

## 2019-02-24 NOTE — LACTATION NOTE
This note was copied from a baby's chart  Observed infant at breast in cradle hold  Good latch/suck noted  Reviewed signs of correct positioning, latch and milk transfer  Discussed possible issues due to late  infant status and handout given  Reviewed normal  infant feeding patterns in the first few days and encouraged not only feeding on cue but attempt to feed every 3 hours in day time  Given admission breastfeeding pkat and same reviewed  Encouraged to call for any additional questions or latch assistance

## 2019-02-24 NOTE — PROGRESS NOTES
Progress Note - OB/GYN   Casper Mons 24 y o  female MRN: [id number]  Unit/Bed#:  321-01 Encounter: [id number]    Assessment:  Post partum Day #1 s/p , stable, baby in room  Cholestasis of pregnancy    Plan:    1) Continue routine post partum care   Encourage ambulation   Encourage breastfeeding   Anticipate discharge tomorrow     Subjective/Objective   Chief Complaint:     Post delivery  Patient is doing well  Lochia WNL  Pain well controlled  Subjective:     Pain: yes, cramping, improved with meds  Tolerating PO: yes  Voiding: yes  Flatus: yes  BM: no  Ambulating: yes  Breastfeeding:  yes  Chest pain: no  Shortness of breath: no  Leg pain: no  Lochia: minimal    Objective:     Vitals: /71   Pulse 67   Temp 98 2 °F (36 8 °C) (Oral)   Resp 16   Ht 5' 1" (1 549 m)   Wt 62 6 kg (138 lb)   LMP 2018 (Approximate)   SpO2 91%   Breastfeeding? Yes   BMI 26 07 kg/m²       Intake/Output Summary (Last 24 hours) at 2019 0634  Last data filed at 2019 2301  Gross per 24 hour   Intake 1700 ml   Output 3477 ml   Net -1777 ml       Lab Results   Component Value Date    WBC 9 39 2019    HGB 11 1 (L) 2019    HCT 34 5 (L) 2019    MCV 83 2019     2019       Physical Exam:     Gen: AAOx3, NAD  CV: RRR  Lungs: CTA b/l  Abd: Soft, non-tender, non-distended, no rebound or guarding  Uterine fundus firm and non-tender, at the umbilicus     Ext: Non tender    Simone Velasco MD  2019  6:34 AM

## 2019-02-25 VITALS
TEMPERATURE: 96.9 F | RESPIRATION RATE: 20 BRPM | SYSTOLIC BLOOD PRESSURE: 92 MMHG | DIASTOLIC BLOOD PRESSURE: 51 MMHG | HEIGHT: 61 IN | BODY MASS INDEX: 26.06 KG/M2 | OXYGEN SATURATION: 99 % | HEART RATE: 92 BPM | WEIGHT: 138 LBS

## 2019-02-25 PROBLEM — Z3A.36 36 WEEKS GESTATION OF PREGNANCY: Status: RESOLVED | Noted: 2018-12-18 | Resolved: 2019-02-25

## 2019-02-25 LAB — RPR SER QL: NORMAL

## 2019-02-25 PROCEDURE — 99024 POSTOP FOLLOW-UP VISIT: CPT | Performed by: OBSTETRICS & GYNECOLOGY

## 2019-02-25 RX ORDER — DOCUSATE SODIUM 100 MG/1
100 CAPSULE, LIQUID FILLED ORAL 2 TIMES DAILY
Qty: 10 CAPSULE | Refills: 0 | Status: SHIPPED | OUTPATIENT
Start: 2019-02-25 | End: 2019-04-03 | Stop reason: HOSPADM

## 2019-02-25 RX ORDER — IBUPROFEN 600 MG/1
600 TABLET ORAL EVERY 6 HOURS PRN
Qty: 30 TABLET | Refills: 0 | Status: SHIPPED | OUTPATIENT
Start: 2019-02-25 | End: 2019-04-03 | Stop reason: HOSPADM

## 2019-02-25 RX ORDER — DIAPER,BRIEF,INFANT-TODD,DISP
1 EACH MISCELLANEOUS AS NEEDED
Qty: 30 G | Refills: 0 | Status: SHIPPED | OUTPATIENT
Start: 2019-02-25 | End: 2019-04-03 | Stop reason: HOSPADM

## 2019-02-25 RX ORDER — ACETAMINOPHEN 325 MG/1
650 TABLET ORAL EVERY 4 HOURS PRN
Qty: 30 TABLET | Refills: 0 | Status: SHIPPED | OUTPATIENT
Start: 2019-02-25 | End: 2019-04-03 | Stop reason: HOSPADM

## 2019-02-25 RX ADMIN — DIPHENHYDRAMINE HCL 25 MG: 25 TABLET ORAL at 08:44

## 2019-02-25 RX ADMIN — DOCUSATE SODIUM 100 MG: 100 CAPSULE, LIQUID FILLED ORAL at 08:03

## 2019-02-25 RX ADMIN — IBUPROFEN 600 MG: 600 TABLET ORAL at 08:02

## 2019-02-25 NOTE — PLAN OF CARE
Problem: PAIN - ADULT  Goal: Verbalizes/displays adequate comfort level or baseline comfort level  Description  Interventions:  - Encourage patient to monitor pain and request assistance  - Assess pain using appropriate pain scale  - Administer analgesics based on type and severity of pain and evaluate response  - Implement non-pharmacological measures as appropriate and evaluate response  - Consider cultural and social influences on pain and pain management  - Notify physician/advanced practitioner if interventions unsuccessful or patient reports new pain  Outcome: Completed     Problem: INFECTION - ADULT  Goal: Absence or prevention of progression during hospitalization  Description  INTERVENTIONS:  - Assess and monitor for signs and symptoms of infection  - Monitor lab/diagnostic results  - Monitor all insertion sites, i e  indwelling lines, tubes, and drains  - Monitor endotracheal (as able) and nasal secretions for changes in amount and color  - [place] appropriate cooling/warming therapies per order  - Administer medications as ordered  - Instruct and encourage patient and family to use good hand hygiene technique  - Identify and instruct in appropriate isolation precautions for identified infection/condition  Outcome: Completed     Problem: SAFETY ADULT  Goal: Patient will remain free of falls  Description  INTERVENTIONS:  - Assess patient frequently for physical needs  -  Identify cognitive and physical deficits and behaviors that affect risk of falls    -  [place] fall precautions as indicated by assessment   - Educate patient/family on patient safety including physical limitations  - Instruct patient to call for assistance with activity based on assessment  - Modify environment to reduce risk of injury  - Consider OT/PT consult to assist with strengthening/mobility  Outcome: Completed  Goal: Maintain or return to baseline ADL function  Description  INTERVENTIONS:  -  Assess patient's ability to carry out ADLs; assess patient's baseline for ADL function and identify physical deficits which impact ability to perform ADLs (bathing, care of mouth/teeth, toileting, grooming, dressing, etc )  - Assess/evaluate cause of self-care deficits   - Assess range of motion  - Assess patient's mobility; develop plan if impaired  - Assess patient's need for assistive devices and provide as appropriate  - Encourage maximum independence but intervene and supervise when necessary  ¯ Involve family in performance of ADLs  ¯ Assess for home care needs following discharge   ¯ Request OT consult to assist with ADL evaluation and planning for discharge  ¯ Provide patient education as appropriate  Outcome: Completed  Goal: Maintain or return mobility status to optimal level  Description  INTERVENTIONS:  - Assess patient's baseline mobility status (ambulation, transfers, stairs, etc )    - Identify cognitive and physical deficits and behaviors that affect mobility  - Identify mobility aids required to assist with transfers and/or ambulation (gait belt, sit-to-stand, lift, walker, cane, etc )  - [place] fall precautions as indicated by assessment  - Record patient progress and toleration of activity level on Mobility SBAR; progress patient to next Phase/Stage  - Instruct patient to call for assistance with activity based on assessment  - Request Rehabilitation consult to assist with strengthening/weightbearing, etc   Outcome: Completed     Problem: Knowledge Deficit  Goal: Patient/family/caregiver demonstrates understanding of disease process, treatment plan, medications, and discharge instructions  Description  Complete learning assessment and assess knowledge base    Interventions:  - Provide teaching at level of understanding  - Provide teaching via preferred learning methods  Outcome: Completed     Problem: DISCHARGE PLANNING  Goal: Discharge to home or other facility with appropriate resources  Description  INTERVENTIONS:  - Identify barriers to discharge w/patient and caregiver  - Arrange for needed discharge resources and transportation as appropriate  - Identify discharge learning needs (meds, wound care, etc )  - Arrange for interpretive services to assist at discharge as needed  - Refer to Case Management Department for coordinating discharge planning if the patient needs post-hospital services based on physician/advanced practitioner order or complex needs related to functional status, cognitive ability, or social support system  Outcome: Completed     Problem: ALTERATION IN THE BREAST  Goal: Optimize infant feeding at the breast  Description  INTERVENTIONS:  - Latch, breast and nipple assessment  - Assess prior breast feeding history  - Hand expression of breast milk  - For cracked, bleeding and or sore nipples reassess latch, treat damaged nipple  -Educate mother on feeding cues  -Positioning/latch techniques  Outcome: Completed     Problem: INADEQUATE LATCH, SUCK OR SWALLOW  Goal: Demonstrate ability to latch and sustain latch, audible swallowing and satiety  Description  INTERVENTIONS:  - Assess oral anatomy, notify Physician/AP for abnormal findings  - Establish milk expression  - Maximize feeding opportunity (skin to skin, behavioral state)  - Position/latch techniques  - Discourage use of pacifier-artificial nipple  - Mechanical pumping  - Nipple Shield  - Supplemental formula feeding (Physician/AP order)  - Alternative feeding method  Outcome: Completed     Problem: POSTPARTUM  Goal: Experiences normal postpartum course  Description  INTERVENTIONS:  - Monitor maternal vital signs  - Assess uterine involution and lochia  Outcome: Completed  Goal: Appropriate maternal -  bonding  Description  INTERVENTIONS:  - Identify family support  - Assess for appropriate maternal/infant bonding   -Encourage maternal/infant bonding opportunities  - Referral to  or  as needed  Outcome: Completed  Goal: Establishment of infant feeding pattern  Description  INTERVENTIONS:  - Assess breast/bottle feeding  - Refer to lactation as needed  Outcome: Completed  Goal: Incision(s), wounds(s) or drain site(s) healing without S/S of infection  Description  INTERVENTIONS  - Assess and document risk factors for skin impairment   - Assess and document dressing, incision, wound bed, drain sites and surrounding tissue  - Initiate Nutrition services consult and/or wound management as needed  Outcome: Completed

## 2019-02-25 NOTE — PROGRESS NOTES
Progress Note - OB/GYN   Ursula Tello 24 y o  female MRN: [id number]  Unit/Bed#:  321-01 Encounter: [id number]    Assessment:  Post partum Day #2 s/p , stable    Plan:  1) Cholestasis of pregnancy   - Patient endorses that she is itchy again today   - Encouraged patient to use PO meds that are available to her  2) Continue routine post partum care   Encourage ambulation   Encourage breastfeeding   Anticipate discharge 19     Subjective/Objective   Chief Complaint:     Post delivery  Patient is doing well  Lochia WNL  Pain well controlled  She reports some itching that was not present yesterday  Subjective:     Pain: yes, cramping, improved with meds  Tolerating PO: yes  Voiding: yes  Flatus: yes  BM: no  Ambulating: yes  Breastfeeding:  yes  Chest pain: no  Shortness of breath: no  Leg pain: no  Lochia: minimal    Objective:     Vitals: /63   Pulse 67   Temp 98 2 °F (36 8 °C) (Oral)   Resp 18   Ht 5' 1" (1 549 m)   Wt 62 6 kg (138 lb)   LMP 2018 (Approximate)   SpO2 91%   Breastfeeding? Yes   BMI 26 07 kg/m²     No intake or output data in the 24 hours ending 19 0650    Lab Results   Component Value Date    WBC 9 39 2019    HGB 11 1 (L) 2019    HCT 34 5 (L) 2019    MCV 83 2019     2019       Physical Exam:     Gen: AAOx3, NAD  CV: RRR  Lungs: CTA b/l  Abd: Soft, non-tender, non-distended, no rebound or guarding  Uterine fundus firm and non-tender, -2 cm below the umbilicus     Ext: Non tender, Negative [place]se[place]'s sign bilaterally    Arlet Rosales MD  2019  6:50 AM

## 2019-04-03 ENCOUNTER — OFFICE VISIT (OUTPATIENT)
Dept: NEUROLOGY | Facility: CLINIC | Age: 24
End: 2019-04-03
Payer: COMMERCIAL

## 2019-04-03 VITALS
HEART RATE: 63 BPM | SYSTOLIC BLOOD PRESSURE: 102 MMHG | HEIGHT: 61 IN | DIASTOLIC BLOOD PRESSURE: 74 MMHG | WEIGHT: 116.2 LBS | BODY MASS INDEX: 21.94 KG/M2

## 2019-04-03 DIAGNOSIS — G44.89 OTHER HEADACHE SYNDROME: ICD-10-CM

## 2019-04-03 DIAGNOSIS — M54.16 LUMBAR RADICULOPATHY: Primary | ICD-10-CM

## 2019-04-03 PROCEDURE — 99214 OFFICE O/P EST MOD 30 MIN: CPT | Performed by: PSYCHIATRY & NEUROLOGY

## 2019-04-03 RX ORDER — INDOMETHACIN 50 MG/1
50 CAPSULE ORAL 3 TIMES DAILY
COMMUNITY
Start: 2019-03-19 | End: 2019-04-19

## 2019-04-03 RX ORDER — GABAPENTIN 100 MG/1
100 CAPSULE ORAL
Qty: 30 CAPSULE | Refills: 1 | Status: SHIPPED | OUTPATIENT
Start: 2019-04-03 | End: 2019-06-13 | Stop reason: SDUPTHER

## 2019-04-19 ENCOUNTER — OFFICE VISIT (OUTPATIENT)
Dept: PAIN MEDICINE | Facility: CLINIC | Age: 24
End: 2019-04-19
Payer: COMMERCIAL

## 2019-04-19 VITALS
HEART RATE: 87 BPM | HEIGHT: 61 IN | WEIGHT: 116 LBS | BODY MASS INDEX: 21.9 KG/M2 | SYSTOLIC BLOOD PRESSURE: 98 MMHG | DIASTOLIC BLOOD PRESSURE: 50 MMHG

## 2019-04-19 DIAGNOSIS — M54.16 LUMBAR RADICULOPATHY: Primary | ICD-10-CM

## 2019-04-19 PROCEDURE — 99214 OFFICE O/P EST MOD 30 MIN: CPT | Performed by: ANESTHESIOLOGY

## 2019-04-22 ENCOUNTER — APPOINTMENT (OUTPATIENT)
Dept: LAB | Facility: CLINIC | Age: 24
End: 2019-04-22
Payer: COMMERCIAL

## 2019-04-22 DIAGNOSIS — G44.89 OTHER HEADACHE SYNDROME: ICD-10-CM

## 2019-04-22 LAB
ALBUMIN SERPL BCP-MCNC: 3.6 G/DL (ref 3.5–5)
ALP SERPL-CCNC: 63 U/L (ref 46–116)
ALT SERPL W P-5'-P-CCNC: 37 U/L (ref 12–78)
ANION GAP SERPL CALCULATED.3IONS-SCNC: 4 MMOL/L (ref 4–13)
AST SERPL W P-5'-P-CCNC: 18 U/L (ref 5–45)
BILIRUB SERPL-MCNC: 0.36 MG/DL (ref 0.2–1)
BUN SERPL-MCNC: 11 MG/DL (ref 5–25)
CALCIUM SERPL-MCNC: 8.6 MG/DL (ref 8.3–10.1)
CHLORIDE SERPL-SCNC: 108 MMOL/L (ref 100–108)
CO2 SERPL-SCNC: 27 MMOL/L (ref 21–32)
CREAT SERPL-MCNC: 0.66 MG/DL (ref 0.6–1.3)
CRP SERPL QL: <3 MG/L
ERYTHROCYTE [DISTWIDTH] IN BLOOD BY AUTOMATED COUNT: 14.3 % (ref 11.6–15.1)
ERYTHROCYTE [SEDIMENTATION RATE] IN BLOOD: 10 MM/HOUR (ref 0–20)
GFR SERPL CREATININE-BSD FRML MDRD: 124 ML/MIN/1.73SQ M
GLUCOSE P FAST SERPL-MCNC: 82 MG/DL (ref 65–99)
HCT VFR BLD AUTO: 37.3 % (ref 34.8–46.1)
HGB BLD-MCNC: 11.6 G/DL (ref 11.5–15.4)
MCH RBC QN AUTO: 26.3 PG (ref 26.8–34.3)
MCHC RBC AUTO-ENTMCNC: 31.1 G/DL (ref 31.4–37.4)
MCV RBC AUTO: 85 FL (ref 82–98)
PLATELET # BLD AUTO: 297 THOUSANDS/UL (ref 149–390)
PMV BLD AUTO: 10.3 FL (ref 8.9–12.7)
POTASSIUM SERPL-SCNC: 3.5 MMOL/L (ref 3.5–5.3)
PROT SERPL-MCNC: 7.5 G/DL (ref 6.4–8.2)
RBC # BLD AUTO: 4.41 MILLION/UL (ref 3.81–5.12)
SODIUM SERPL-SCNC: 139 MMOL/L (ref 136–145)
WBC # BLD AUTO: 5.22 THOUSAND/UL (ref 4.31–10.16)

## 2019-04-22 PROCEDURE — 80053 COMPREHEN METABOLIC PANEL: CPT

## 2019-04-22 PROCEDURE — 85652 RBC SED RATE AUTOMATED: CPT

## 2019-04-22 PROCEDURE — 85027 COMPLETE CBC AUTOMATED: CPT

## 2019-04-22 PROCEDURE — 86140 C-REACTIVE PROTEIN: CPT

## 2019-04-22 PROCEDURE — 86618 LYME DISEASE ANTIBODY: CPT

## 2019-04-22 PROCEDURE — 36415 COLL VENOUS BLD VENIPUNCTURE: CPT

## 2019-04-23 ENCOUNTER — POSTPARTUM VISIT (OUTPATIENT)
Dept: OBGYN CLINIC | Age: 24
End: 2019-04-23

## 2019-04-23 VITALS
WEIGHT: 114 LBS | DIASTOLIC BLOOD PRESSURE: 64 MMHG | SYSTOLIC BLOOD PRESSURE: 112 MMHG | BODY MASS INDEX: 21.52 KG/M2 | HEIGHT: 61 IN

## 2019-04-23 DIAGNOSIS — Z30.09 BIRTH CONTROL COUNSELING: ICD-10-CM

## 2019-04-23 PROBLEM — Z34.93 ENCOUNTER FOR SUPERVISION OF NORMAL PREGNANCY IN THIRD TRIMESTER: Status: RESOLVED | Noted: 2018-11-01 | Resolved: 2019-04-23

## 2019-04-23 PROBLEM — O09.899 SHORT INTERVAL BETWEEN PREGNANCIES COMPLICATING PREGNANCY, ANTEPARTUM: Status: RESOLVED | Noted: 2018-11-02 | Resolved: 2019-04-23

## 2019-04-23 PROBLEM — M54.9 BACK PAIN IN PREGNANCY: Status: RESOLVED | Noted: 2018-10-16 | Resolved: 2019-04-23

## 2019-04-23 PROBLEM — O99.891 BACK PAIN IN PREGNANCY: Status: RESOLVED | Noted: 2018-10-16 | Resolved: 2019-04-23

## 2019-04-23 LAB
B BURGDOR IGG SER IA-ACNC: 0.13
B BURGDOR IGM SER IA-ACNC: 0.77

## 2019-04-23 PROCEDURE — 99024 POSTOP FOLLOW-UP VISIT: CPT | Performed by: NURSE PRACTITIONER

## 2019-04-24 ENCOUNTER — HOSPITAL ENCOUNTER (OUTPATIENT)
Dept: MRI IMAGING | Facility: CLINIC | Age: 24
Discharge: HOME | End: 2019-04-24
Payer: COMMERCIAL

## 2019-04-24 ENCOUNTER — TELEPHONE (OUTPATIENT)
Dept: OBGYN CLINIC | Age: 24
End: 2019-04-24

## 2019-04-24 DIAGNOSIS — G44.89 OTHER HEADACHE SYNDROME: ICD-10-CM

## 2019-04-24 DIAGNOSIS — M54.16 LUMBAR RADICULOPATHY: ICD-10-CM

## 2019-04-24 PROCEDURE — 70551 MRI BRAIN STEM W/O DYE: CPT

## 2019-04-24 PROCEDURE — 72148 MRI LUMBAR SPINE W/O DYE: CPT

## 2019-05-03 ENCOUNTER — OFFICE VISIT (OUTPATIENT)
Dept: OBGYN CLINIC | Age: 24
End: 2019-05-03
Payer: COMMERCIAL

## 2019-05-03 VITALS
BODY MASS INDEX: 21.52 KG/M2 | DIASTOLIC BLOOD PRESSURE: 62 MMHG | SYSTOLIC BLOOD PRESSURE: 110 MMHG | WEIGHT: 114 LBS | HEIGHT: 61 IN

## 2019-05-03 DIAGNOSIS — Z32.02 URINE PREGNANCY TEST NEGATIVE: ICD-10-CM

## 2019-05-03 DIAGNOSIS — Z30.430 ENCOUNTER FOR IUD INSERTION: Primary | ICD-10-CM

## 2019-05-03 LAB — SL AMB POCT URINE HCG: NORMAL

## 2019-05-03 PROCEDURE — 58300 INSERT INTRAUTERINE DEVICE: CPT | Performed by: NURSE PRACTITIONER

## 2019-05-03 PROCEDURE — 87491 CHLMYD TRACH DNA AMP PROBE: CPT | Performed by: NURSE PRACTITIONER

## 2019-05-03 PROCEDURE — 87591 N.GONORRHOEAE DNA AMP PROB: CPT | Performed by: NURSE PRACTITIONER

## 2019-05-03 PROCEDURE — 81025 URINE PREGNANCY TEST: CPT | Performed by: NURSE PRACTITIONER

## 2019-05-03 RX ORDER — COPPER 313.4 MG/1
1 INTRAUTERINE DEVICE INTRAUTERINE ONCE
Status: COMPLETED | OUTPATIENT
Start: 2019-05-03 | End: 2019-05-03

## 2019-05-03 RX ADMIN — COPPER 1 INTRA UTERINE DEVICE: 313.4 INTRAUTERINE DEVICE INTRAUTERINE at 10:15

## 2019-05-06 LAB
C TRACH DNA SPEC QL NAA+PROBE: NEGATIVE
N GONORRHOEA DNA SPEC QL NAA+PROBE: NEGATIVE

## 2019-06-01 ENCOUNTER — HOSPITAL ENCOUNTER (EMERGENCY)
Facility: HOSPITAL | Age: 24
Discharge: HOME | End: 2019-06-01
Attending: EMERGENCY MEDICINE | Admitting: EMERGENCY MEDICINE
Payer: COMMERCIAL

## 2019-06-01 VITALS
RESPIRATION RATE: 22 BRPM | OXYGEN SATURATION: 99 % | DIASTOLIC BLOOD PRESSURE: 77 MMHG | HEART RATE: 78 BPM | SYSTOLIC BLOOD PRESSURE: 126 MMHG | TEMPERATURE: 98.1 F

## 2019-06-01 DIAGNOSIS — R06.2 WHEEZING: ICD-10-CM

## 2019-06-01 DIAGNOSIS — J40 BRONCHITIS: Primary | ICD-10-CM

## 2019-06-01 DIAGNOSIS — R09.81 NASAL CONGESTION: ICD-10-CM

## 2019-06-01 DIAGNOSIS — J30.2 SEASONAL ALLERGIES: ICD-10-CM

## 2019-06-01 PROCEDURE — 99283 EMERGENCY DEPT VISIT LOW MDM: CPT | Performed by: EMERGENCY MEDICINE

## 2019-06-01 PROCEDURE — 94640 AIRWAY INHALATION TREATMENT: CPT

## 2019-06-01 PROCEDURE — 99284 EMERGENCY DEPT VISIT MOD MDM: CPT

## 2019-06-01 RX ORDER — FLUTICASONE PROPIONATE 50 MCG
1 SPRAY, SUSPENSION (ML) NASAL DAILY PRN
Qty: 16 G | Refills: 0 | Status: SHIPPED | OUTPATIENT
Start: 2019-06-01

## 2019-06-01 RX ORDER — ALBUTEROL SULFATE 2.5 MG/3ML
2.5 SOLUTION RESPIRATORY (INHALATION) ONCE
Status: COMPLETED | OUTPATIENT
Start: 2019-06-01 | End: 2019-06-01

## 2019-06-01 RX ORDER — DIPHENHYDRAMINE HCL 25 MG
25 TABLET ORAL EVERY 6 HOURS PRN
COMMUNITY
End: 2019-06-13 | Stop reason: ALTCHOICE

## 2019-06-01 RX ORDER — ALBUTEROL SULFATE 90 UG/1
2 AEROSOL, METERED RESPIRATORY (INHALATION) EVERY 4 HOURS PRN
Qty: 1 INHALER | Refills: 0 | Status: SHIPPED | OUTPATIENT
Start: 2019-06-01

## 2019-06-01 RX ADMIN — WATER 10 ML: 1 INJECTION INTRAMUSCULAR; INTRAVENOUS; SUBCUTANEOUS at 17:27

## 2019-06-01 RX ADMIN — ALBUTEROL SULFATE 2.5 MG: 2.5 SOLUTION RESPIRATORY (INHALATION) at 17:27

## 2019-06-13 ENCOUNTER — OFFICE VISIT (OUTPATIENT)
Dept: NEUROLOGY | Facility: CLINIC | Age: 24
End: 2019-06-13
Payer: COMMERCIAL

## 2019-06-13 VITALS
BODY MASS INDEX: 19.63 KG/M2 | DIASTOLIC BLOOD PRESSURE: 80 MMHG | WEIGHT: 104 LBS | HEIGHT: 61 IN | SYSTOLIC BLOOD PRESSURE: 110 MMHG | HEART RATE: 72 BPM

## 2019-06-13 DIAGNOSIS — M54.16 LUMBAR RADICULOPATHY: ICD-10-CM

## 2019-06-13 DIAGNOSIS — G44.89 OTHER HEADACHE SYNDROME: ICD-10-CM

## 2019-06-13 PROCEDURE — 99214 OFFICE O/P EST MOD 30 MIN: CPT | Performed by: PSYCHIATRY & NEUROLOGY

## 2019-06-13 RX ORDER — GABAPENTIN 100 MG/1
CAPSULE ORAL
Qty: 60 CAPSULE | Refills: 1 | Status: SHIPPED | OUTPATIENT
Start: 2019-06-13 | End: 2019-09-24 | Stop reason: SDUPTHER

## 2019-09-24 ENCOUNTER — OFFICE VISIT (OUTPATIENT)
Dept: NEUROLOGY | Facility: CLINIC | Age: 24
End: 2019-09-24
Payer: COMMERCIAL

## 2019-09-24 VITALS
HEIGHT: 61 IN | BODY MASS INDEX: 18.46 KG/M2 | HEART RATE: 64 BPM | DIASTOLIC BLOOD PRESSURE: 80 MMHG | SYSTOLIC BLOOD PRESSURE: 110 MMHG | WEIGHT: 97.8 LBS

## 2019-09-24 DIAGNOSIS — M54.16 LUMBAR RADICULOPATHY: Primary | ICD-10-CM

## 2019-09-24 DIAGNOSIS — G44.89 OTHER HEADACHE SYNDROME: ICD-10-CM

## 2019-09-24 PROCEDURE — 99214 OFFICE O/P EST MOD 30 MIN: CPT | Performed by: PSYCHIATRY & NEUROLOGY

## 2019-09-24 RX ORDER — GABAPENTIN 100 MG/1
CAPSULE ORAL
Qty: 60 CAPSULE | Refills: 4 | Status: SHIPPED | OUTPATIENT
Start: 2019-09-24

## 2019-10-18 ENCOUNTER — HOSPITAL ENCOUNTER (EMERGENCY)
Facility: HOSPITAL | Age: 24
Discharge: HOME | End: 2019-10-18
Attending: EMERGENCY MEDICINE | Admitting: EMERGENCY MEDICINE
Payer: COMMERCIAL

## 2019-10-18 ENCOUNTER — TELEPHONE (OUTPATIENT)
Dept: NEUROLOGY | Facility: CLINIC | Age: 24
End: 2019-10-18

## 2019-10-18 VITALS
HEIGHT: 61 IN | HEART RATE: 98 BPM | WEIGHT: 97 LBS | DIASTOLIC BLOOD PRESSURE: 66 MMHG | TEMPERATURE: 98.4 F | SYSTOLIC BLOOD PRESSURE: 122 MMHG | BODY MASS INDEX: 18.31 KG/M2 | OXYGEN SATURATION: 98 % | RESPIRATION RATE: 16 BRPM

## 2019-10-18 DIAGNOSIS — M54.9 UPPER BACK PAIN ON RIGHT SIDE: Primary | ICD-10-CM

## 2019-10-18 PROCEDURE — 99283 EMERGENCY DEPT VISIT LOW MDM: CPT

## 2019-10-18 PROCEDURE — 96372 THER/PROPH/DIAG INJ SC/IM: CPT

## 2019-10-18 PROCEDURE — 99285 EMERGENCY DEPT VISIT HI MDM: CPT | Performed by: EMERGENCY MEDICINE

## 2019-10-18 RX ORDER — NAPROXEN 500 MG/1
500 TABLET ORAL 2 TIMES DAILY PRN
Qty: 14 TABLET | Refills: 0 | Status: SHIPPED | OUTPATIENT
Start: 2019-10-18 | End: 2019-11-22

## 2019-10-18 RX ORDER — KETOROLAC TROMETHAMINE 30 MG/ML
15 INJECTION, SOLUTION INTRAMUSCULAR; INTRAVENOUS ONCE
Status: COMPLETED | OUTPATIENT
Start: 2019-10-18 | End: 2019-10-18

## 2019-10-18 RX ORDER — DIAZEPAM 5 MG/ML
5 INJECTION, SOLUTION INTRAMUSCULAR; INTRAVENOUS ONCE
Status: COMPLETED | OUTPATIENT
Start: 2019-10-18 | End: 2019-10-18

## 2019-10-18 RX ORDER — METHOCARBAMOL 500 MG/1
500 TABLET, FILM COATED ORAL 2 TIMES DAILY
Qty: 20 TABLET | Refills: 0 | Status: SHIPPED | OUTPATIENT
Start: 2019-10-18 | End: 2019-11-22

## 2019-10-18 RX ADMIN — KETOROLAC TROMETHAMINE 15 MG: 30 INJECTION, SOLUTION INTRAMUSCULAR at 19:43

## 2019-10-18 RX ADMIN — DIAZEPAM 5 MG: 10 INJECTION, SOLUTION INTRAMUSCULAR; INTRAVENOUS at 19:48
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
## 2019-10-18 NOTE — TELEPHONE ENCOUNTER
Pt reports that she woke up with excruciating pain in her back that is spreading to her ribs  Reports that she has had this before, but this time it is in her RUQ and spreading to her ribs on the right  Reports that she hasn't had any recent strenuous activity to cause this  She continues gabapentin 200 mg at night  She has tried advil, tylenol and ibuprofen and it is not helping  Pt reports that she is not going to make it into work today  Pt breathing heavy on phone due to pain  [phone] Ok leave detailed message

## 2019-10-18 NOTE — TELEPHONE ENCOUNTER
Left message for the patient to call back, advised her to go to the ER if has any worsening symptoms

## 2019-10-19 NOTE — ED NOTES
D/c reviewed with pt  Pt verbalized understanding and has no further questions at this time        [address] Michael Persaud RN  10/18/19 2055

## 2019-11-22 ENCOUNTER — OFFICE VISIT (OUTPATIENT)
Dept: OBGYN CLINIC | Age: 24
End: 2019-11-22
Payer: COMMERCIAL

## 2019-11-22 VITALS
BODY MASS INDEX: 18.12 KG/M2 | HEIGHT: 61 IN | SYSTOLIC BLOOD PRESSURE: 118 MMHG | DIASTOLIC BLOOD PRESSURE: 62 MMHG | WEIGHT: 96 LBS

## 2019-11-22 DIAGNOSIS — B96.89 BACTERIAL VAGINOSIS: ICD-10-CM

## 2019-11-22 DIAGNOSIS — N76.0 BACTERIAL VAGINOSIS: ICD-10-CM

## 2019-11-22 DIAGNOSIS — Z11.3 SCREENING FOR STDS (SEXUALLY TRANSMITTED DISEASES): Primary | ICD-10-CM

## 2019-11-22 PROCEDURE — 87210 SMEAR WET MOUNT SALINE/INK: CPT | Performed by: NURSE PRACTITIONER

## 2019-11-22 PROCEDURE — 99213 OFFICE O/P EST LOW 20 MIN: CPT | Performed by: NURSE PRACTITIONER

## 2019-11-22 PROCEDURE — 87591 N.GONORRHOEAE DNA AMP PROB: CPT | Performed by: NURSE PRACTITIONER

## 2019-11-22 PROCEDURE — 87661 TRICHOMONAS VAGINALIS AMPLIF: CPT | Performed by: NURSE PRACTITIONER

## 2019-11-22 PROCEDURE — 87491 CHLMYD TRACH DNA AMP PROBE: CPT | Performed by: NURSE PRACTITIONER

## 2019-11-22 RX ORDER — FLUCONAZOLE 150 MG/1
150 TABLET ORAL ONCE
Qty: 2 TABLET | Refills: 0 | Status: SHIPPED | OUTPATIENT
Start: 2019-11-22 | End: 2019-11-22

## 2019-11-22 RX ORDER — METRONIDAZOLE 500 MG/1
500 TABLET ORAL EVERY 12 HOURS SCHEDULED
Qty: 14 TABLET | Refills: 0 | Status: SHIPPED | OUTPATIENT
Start: 2019-11-22 | End: 2019-11-29

## 2019-11-22 NOTE — PATIENT INSTRUCTIONS
Bacterial Vaginosis   WHAT YOU NEED TO KNOW:   What is bacterial vaginosis? Bacterial vaginosis (BV) is an infection in the vagina  It may cause vaginitis, which is irritation and inflammation of the vagina  What causes bacterial vaginosis? The cause of BV is not known  With BV, there is an imbalance in bacteria normally found in the vagina  Your risk for BV increases if you are sexually active  Your risk for BV also increases if you douche or have an intrauterine device (IUD)  What are the signs and symptoms of bacterial vaginosis? Some women have no symptoms  You may have the following:  · White, gray, or yellow vaginal discharge    · Vaginal discharge that smells like fish    · Itching or burning around the outside of your vagina  How is bacterial vaginosis diagnosed? Your healthcare provider will examine you and ask if you have other health conditions  He may need to take a sample of fluid from your vagina  This will be tested for the bacteria that causes BV  How is bacterial vaginosis treated? Antibiotics are given to kill the bacteria that cause BV  They may be given as a pill or as a cream to put in your vagina  Take or use as directed  What are the risks of bacterial vaginosis? If untreated, BV may spread and lead to serious infections in your uterus and fallopian tubes  This can make it more difficult to get pregnant  BV increases your risk for other sexually transmitted infections (STIs), such as chlamydia, gonorrhea, or HIV  How can I prevent bacterial vaginosis? · Keep your vaginal area clean and dry:  Wear underwear and pantyhose with a cotton crotch  Wipe from front to back after you urinate or have a bowel movement  After bathing, rinse soap from your vaginal area to decrease your risk for irritation  · Do not use products that cause irritation:  Always use unscented tampons or sanitary pads   Do not use feminine sprays, powders, or scented tampons because they may cause irritation and increase your risk of BV  Detergents and fabric softeners may also cause irritation  · Do not douche: This can cause an imbalance in healthy vaginal bacteria  · Use latex condoms: This helps prevent another infection and keeps your partner from getting the infection  When should I contact my healthcare provider? · Your symptoms come back or do not improve with treatment  · You have vaginal bleeding that is not your monthly period  · You have questions or concerns about your condition or care  CARE AGREEMENT:   You have the right to help plan your care  Learn about your health condition and how it may be treated  Discuss treatment options with your caregivers to decide what care you want to receive  You always have the right to refuse treatment  The above information is an  only  It is not intended as medical advice for individual conditions or treatments  Talk to your doctor, nurse or pharmacist before following any medical regimen to see if it is safe and effective for you  © 2017 [facility] Information is for End User's use only and may not be sold, redistributed or otherwise used for commercial purposes  All illustrations and images included in CareNotes® are the copyrighted property of A D A M , Inc  or Jose Luis Ledezma
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
## 2019-11-26 LAB
C TRACH DNA SPEC QL NAA+PROBE: NEGATIVE
N GONORRHOEA DNA SPEC QL NAA+PROBE: NEGATIVE

## 2019-11-27 LAB — T VAGINALIS RRNA SPEC QL NAA+PROBE: NEGATIVE

## 2020-01-07 ENCOUNTER — OFFICE VISIT (OUTPATIENT)
Dept: OBGYN CLINIC | Age: 25
End: 2020-01-07
Payer: COMMERCIAL

## 2020-01-07 VITALS
SYSTOLIC BLOOD PRESSURE: 118 MMHG | BODY MASS INDEX: 17.75 KG/M2 | HEIGHT: 61 IN | DIASTOLIC BLOOD PRESSURE: 72 MMHG | WEIGHT: 94 LBS

## 2020-01-07 DIAGNOSIS — B37.49 CANDIDA INFECTION OF GENITAL REGION: Primary | ICD-10-CM

## 2020-01-07 PROBLEM — L29.9 PRURITUS OF PREGNANCY IN THIRD TRIMESTER: Status: RESOLVED | Noted: 2019-02-19 | Resolved: 2020-01-07

## 2020-01-07 PROBLEM — O99.713 PRURITUS OF PREGNANCY IN THIRD TRIMESTER: Status: RESOLVED | Noted: 2019-02-19 | Resolved: 2020-01-07

## 2020-01-07 PROBLEM — Z30.430 ENCOUNTER FOR IUD INSERTION: Status: RESOLVED | Noted: 2019-05-03 | Resolved: 2020-01-07

## 2020-01-07 PROBLEM — Z87.59 HISTORY OF PRIOR PREGNANCY WITH IUGR NEWBORN: Status: RESOLVED | Noted: 2018-11-02 | Resolved: 2020-01-07

## 2020-01-07 PROCEDURE — 87210 SMEAR WET MOUNT SALINE/INK: CPT | Performed by: NURSE PRACTITIONER

## 2020-01-07 PROCEDURE — 99213 OFFICE O/P EST LOW 20 MIN: CPT | Performed by: NURSE PRACTITIONER
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
## 2020-01-07 NOTE — PATIENT INSTRUCTIONS
Vulvovaginal Candidiasis   WHAT YOU NEED TO KNOW:   What is vulvovaginal candidiasis? Vulvovaginal candidiasis, or yeast infection, is a common vaginal infection  Vulvovaginal candidiasis is caused by a fungus, or yeast-like germ  Fungi are normally found in your vagina  When there are too many fungi, it can cause an infection  What increases my risk for vulvovaginal candidiasis? · Pregnancy    · Medical conditions that suppress your immune system, such as diabetes or HIV and AIDS    · Medicines, such as antibiotics, birth control pills, or steroid medicine    · Contraceptive devices, such as diaphragms, sponges, and intrauterine devices  What are the signs and symptoms of vulvovaginal candidiasis? · Thick, white, cheese-like discharge from your vagina    · Itching, swelling, or redness in your vagina    · Burning when you urinate  How is vulvovaginal candidiasis diagnosed? Your healthcare provider will ask about your medical history and examine you  A sample of your vaginal discharge may show what germ is causing your infection  How is vulvovaginal candidiasis treated? Medicines help treat the fungal infection and decrease inflammation  The medicine may be a pill, topical cream, or vaginal suppository  With treatment, the infection is usually gone within a week: How can I manage my symptoms? · Wear cotton underwear  · Keep the vaginal area clean and dry  · Wipe from front to back after you urinate or have a bowel movement  · Do not have sex until your symptoms are gone  · Do not douche  · Do not use strong perfumes or soaps  · Do not use feminine hygiene sprays, powders, or bubble bath  How can I prevent another infection? · Take showers instead of baths  · Eat yogurt  · Limit the amount of alcohol you drink  · Limit your time in hot tubs  · Control your blood sugar if you are diabetic  When should I seek immediate care? · You have fever and chills      · You are bleeding from your vagina and it is not your monthly period  · You develop abdominal or pelvic pain  When should I contact my healthcare provider? · Your signs and symptoms get worse, even after treatment  · You have questions or concerns about your condition or care  CARE AGREEMENT:   You have the right to help plan your care  Learn about your health condition and how it may be treated  Discuss treatment options with your caregivers to decide what care you want to receive  You always have the right to refuse treatment  The above information is an  only  It is not intended as medical advice for individual conditions or treatments  Talk to your doctor, nurse or pharmacist before following any medical regimen to see if it is safe and effective for you  © 2017 [employer] Information is for End User's use only and may not be sold, redistributed or otherwise used for commercial purposes  All illustrations and images included in CareNotes® are the copyrighted property of [employer]  or [employer] Ledez[employer]

## 2020-02-04 ENCOUNTER — OFFICE VISIT (OUTPATIENT)
Dept: NEUROLOGY | Facility: CLINIC | Age: 25
End: 2020-02-04
Payer: COMMERCIAL

## 2020-02-04 VITALS
BODY MASS INDEX: 18.31 KG/M2 | HEIGHT: 61 IN | HEART RATE: 80 BPM | SYSTOLIC BLOOD PRESSURE: 112 MMHG | WEIGHT: 97 LBS | DIASTOLIC BLOOD PRESSURE: 62 MMHG

## 2020-02-04 DIAGNOSIS — S29.019D THORACIC MYOFASCIAL STRAIN, SUBSEQUENT ENCOUNTER: ICD-10-CM

## 2020-02-04 DIAGNOSIS — M54.16 LUMBAR RADICULOPATHY: Primary | ICD-10-CM

## 2020-02-04 DIAGNOSIS — G44.89 OTHER HEADACHE SYNDROME: ICD-10-CM

## 2020-02-04 PROBLEM — S29.019A THORACIC MYOFASCIAL STRAIN: Status: ACTIVE | Noted: 2020-02-04

## 2020-02-04 PROCEDURE — 99214 OFFICE O/P EST MOD 30 MIN: CPT | Performed by: PSYCHIATRY & NEUROLOGY
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
## 2020-05-19 ENCOUNTER — ANNUAL EXAM (OUTPATIENT)
Dept: OBGYN CLINIC | Age: 25
End: 2020-05-19
Payer: COMMERCIAL

## 2020-05-19 VITALS
SYSTOLIC BLOOD PRESSURE: 118 MMHG | HEIGHT: 61 IN | WEIGHT: 101 LBS | BODY MASS INDEX: 19.07 KG/M2 | DIASTOLIC BLOOD PRESSURE: 68 MMHG

## 2020-05-19 DIAGNOSIS — N76.0 ACUTE VAGINITIS: ICD-10-CM

## 2020-05-19 DIAGNOSIS — Z11.3 SCREENING FOR STDS (SEXUALLY TRANSMITTED DISEASES): Primary | ICD-10-CM

## 2020-05-19 DIAGNOSIS — Z01.411 ENCOUNTER FOR GYNECOLOGICAL EXAMINATION WITH ABNORMAL FINDING: ICD-10-CM

## 2020-05-19 PROCEDURE — S0612 ANNUAL GYNECOLOGICAL EXAMINA: HCPCS | Performed by: NURSE PRACTITIONER

## 2020-05-19 RX ORDER — METRONIDAZOLE 7.5 MG/G
1 GEL VAGINAL
Qty: 45 G | Refills: 0 | Status: SHIPPED | OUTPATIENT
Start: 2020-05-19 | End: 2020-05-24

## 2020-05-19 RX ORDER — FLUCONAZOLE 150 MG/1
150 TABLET ORAL ONCE
Qty: 2 TABLET | Refills: 0 | Status: SHIPPED | OUTPATIENT
Start: 2020-05-19 | End: 2020-05-19

## 2021-04-16 NOTE — PROGRESS NOTES
[id number] [address]: Ms Rebecca Crabtree was seen today at 36w5d for NST (found under the pregnancy episode) which I reviewed the RN assessment and agree  First BP noted as elevated; I attempted to call her to review symptoms however there was no answer at her phone  Her prior growth measurement ultrasound 4/4/18 was edited to reflect the due date of 5/6/18 and growth is still at the 8th percentile (see amended report) therefore would continue management as IUGR  Please don't hesitate to contact our office with any concerns or questions    Lisbeth Fuentes MD
negative

## 2022-03-09 NOTE — TELEPHONE ENCOUNTER
Patient was seen today by Lesvia Hobson - she went over her symptoms with provider  glipiZIDE 10 mg oral tablet: 1 tab(s) orally once a day  metFORMIN 500 mg oral tablet: 1 tab(s) orally once a day  rivaroxaban 15 mg oral tablet: 1 tab(s) orally 2 times a day    glipiZIDE 10 mg oral tablet: 1 tab(s) orally once a day  meclizine 25 mg oral tablet: 1 tab(s) orally every 6 hours, As needed, Dizziness  metFORMIN 500 mg oral tablet: 1 tab(s) orally once a day  methocarbamol 750 mg oral tablet: 1 tab(s) orally once a day (at bedtime)

## 2023-07-31 NOTE — DISCHARGE INSTR - AVS FIRST PAGE
Follow up at all scheduled appointments  Make appointments with pain management specialists
Monitor for s/s aspiration/laryngeal penetration. If noted:  D/C p.o. intake, provide non-oral nutrition/hydration/meds, and contact this service @ [phone]/change of breathing pattern/cough/gurgly voice/fever/pneumonia/throat clearing/upper respiratory infection

## 2023-08-23 NOTE — ASSESSMENT & PLAN NOTE
Patient overall doing well  Has level II US scheduled  Has not yet done PN labs, encouraged her to complete ASAP  Prior LSIL pap, due for repap - this was collected today  Flu shot today  (V5) oriented
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
